# Patient Record
Sex: FEMALE | Race: WHITE | NOT HISPANIC OR LATINO
[De-identification: names, ages, dates, MRNs, and addresses within clinical notes are randomized per-mention and may not be internally consistent; named-entity substitution may affect disease eponyms.]

---

## 2017-10-03 ENCOUNTER — APPOINTMENT (OUTPATIENT)
Dept: ORTHOPEDIC SURGERY | Facility: CLINIC | Age: 77
End: 2017-10-03
Payer: MEDICARE

## 2017-10-03 VITALS
HEART RATE: 69 BPM | BODY MASS INDEX: 24.41 KG/M2 | DIASTOLIC BLOOD PRESSURE: 88 MMHG | WEIGHT: 143 LBS | SYSTOLIC BLOOD PRESSURE: 134 MMHG | HEIGHT: 64 IN

## 2017-10-03 DIAGNOSIS — S83.004A UNSPECIFIED DISLOCATION OF RIGHT PATELLA, INITIAL ENCOUNTER: ICD-10-CM

## 2017-10-03 DIAGNOSIS — S83.005A UNSPECIFIED DISLOCATION OF LEFT PATELLA, INITIAL ENCOUNTER: ICD-10-CM

## 2017-10-03 PROCEDURE — 99204 OFFICE O/P NEW MOD 45 MIN: CPT

## 2017-10-03 PROCEDURE — 73562 X-RAY EXAM OF KNEE 3: CPT | Mod: LT

## 2017-10-06 ENCOUNTER — APPOINTMENT (OUTPATIENT)
Dept: MRI IMAGING | Facility: CLINIC | Age: 77
End: 2017-10-06

## 2019-07-12 DIAGNOSIS — Z84.89 FAMILY HISTORY OF OTHER SPECIFIED CONDITIONS: ICD-10-CM

## 2019-07-12 DIAGNOSIS — Z78.9 OTHER SPECIFIED HEALTH STATUS: ICD-10-CM

## 2019-07-12 DIAGNOSIS — Z83.3 FAMILY HISTORY OF DIABETES MELLITUS: ICD-10-CM

## 2019-07-12 DIAGNOSIS — Z82.49 FAMILY HISTORY OF ISCHEMIC HEART DISEASE AND OTHER DISEASES OF THE CIRCULATORY SYSTEM: ICD-10-CM

## 2019-07-12 DIAGNOSIS — K63.5 POLYP OF COLON: ICD-10-CM

## 2019-07-12 DIAGNOSIS — Z81.8 FAMILY HISTORY OF OTHER MENTAL AND BEHAVIORAL DISORDERS: ICD-10-CM

## 2019-07-12 DIAGNOSIS — Z02.82 ENCOUNTER FOR ADOPTION SERVICES: ICD-10-CM

## 2019-07-12 DIAGNOSIS — E04.1 NONTOXIC SINGLE THYROID NODULE: ICD-10-CM

## 2019-07-16 ENCOUNTER — APPOINTMENT (OUTPATIENT)
Dept: INTERNAL MEDICINE | Facility: CLINIC | Age: 79
End: 2019-07-16
Payer: MEDICARE

## 2019-07-16 ENCOUNTER — NON-APPOINTMENT (OUTPATIENT)
Age: 79
End: 2019-07-16

## 2019-07-16 VITALS
RESPIRATION RATE: 16 BRPM | DIASTOLIC BLOOD PRESSURE: 70 MMHG | HEIGHT: 64 IN | HEART RATE: 72 BPM | BODY MASS INDEX: 25.27 KG/M2 | SYSTOLIC BLOOD PRESSURE: 130 MMHG | WEIGHT: 148 LBS

## 2019-07-16 DIAGNOSIS — C44.92 SQUAMOUS CELL CARCINOMA OF SKIN, UNSPECIFIED: ICD-10-CM

## 2019-07-16 DIAGNOSIS — Z80.7 FAMILY HISTORY OF OTHER MALIGNANT NEOPLASMS OF LYMPHOID, HEMATOPOIETIC AND RELATED TISSUES: ICD-10-CM

## 2019-07-16 DIAGNOSIS — H90.5 UNSPECIFIED SENSORINEURAL HEARING LOSS: ICD-10-CM

## 2019-07-16 PROCEDURE — 99203 OFFICE O/P NEW LOW 30 MIN: CPT | Mod: 25

## 2019-07-16 PROCEDURE — G0439: CPT

## 2019-07-16 PROCEDURE — G0444 DEPRESSION SCREEN ANNUAL: CPT | Mod: 59

## 2019-07-16 PROCEDURE — 93000 ELECTROCARDIOGRAM COMPLETE: CPT | Mod: 59

## 2019-07-16 PROCEDURE — 99497 ADVNCD CARE PLAN 30 MIN: CPT | Mod: 33

## 2019-07-16 RX ORDER — AMLODIPINE BESYLATE 5 MG/1
TABLET ORAL
Refills: 0 | Status: DISCONTINUED | COMMUNITY

## 2019-07-16 RX ORDER — CYANOCOBALAMIN (VITAMIN B-12) 1000MCG/ML
VIAL (ML) INJECTION
Refills: 0 | Status: DISCONTINUED | COMMUNITY

## 2019-07-16 NOTE — HISTORY OF PRESENT ILLNESS
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

## 2019-07-16 NOTE — HEALTH RISK ASSESSMENT
[Good] : ~his/her~  mood as  good [] : No [Yes] : Yes [4 or more  times a week (4 pts)] : 4 or more  times a week (4 points) [1 or 2 (0 pts)] : 1 or 2 (0 points) [Never (0 pts)] : Never (0 points) [No falls in past year] : Patient reported no falls in the past year [de-identified] : Yoga [de-identified] : Low salt and fats [Patient reported mammogram was normal] : Patient reported mammogram was normal [Patient reported PAP Smear was normal] : Patient reported PAP Smear was normal [Patient reported colonoscopy was normal] : Patient reported colonoscopy was normal [None] : None [With Family] : lives with family [Retired] : retired [] :  [Feels Safe at Home] : Feels safe at home [Fully functional (bathing, dressing, toileting, transferring, walking, feeding)] : Fully functional (bathing, dressing, toileting, transferring, walking, feeding) [Fully functional (using the telephone, shopping, preparing meals, housekeeping, doing laundry, using] : Fully functional and needs no help or supervision to perform IADLs (using the telephone, shopping, preparing meals, housekeeping, doing laundry, using transportation, managing medications and managing finances) [Smoke Detector] : smoke detector [Carbon Monoxide Detector] : carbon monoxide detector [Seat Belt] :  uses seat belt [Sunscreen] : uses sunscreen [MammogramDate] : 07/18 [PapSmearDate] : 12/18 [ColonoscopyDate] : 07/17 [de-identified] : Dental office [With Patient/Caregiver] : With Patient/Caregiver [Designated Healthcare Proxy] : Designated healthcare proxy [Name: ___] : Health Care Proxy's Name: [unfilled]  [AdvancecareDate] : 07/19 [FreeTextEntry4] : Had long discussion with the patient.\par Discussed with pt the need for a health care proxy.\par Discussed who can be a proxy, forms given if needed, and the need for the proxy to know their wishes and to make sure they will comply.\par The proxy will need to have a copy in their home and the patient should have a copy.\par \par

## 2019-07-16 NOTE — REVIEW OF SYSTEMS
[Hearing Loss] : hearing loss [Negative] : Heme/Lymph [FreeTextEntry3] : last optho - 12/18 [FreeTextEntry9] : ABBIE [de-identified] : sees derm

## 2019-07-16 NOTE — PHYSICAL EXAM
[No Acute Distress] : no acute distress [Well Nourished] : well nourished [Well Developed] : well developed [Well-Appearing] : well-appearing [Normal Sclera/Conjunctiva] : normal sclera/conjunctiva [PERRL] : pupils equal round and reactive to light [EOMI] : extraocular movements intact [Normal Outer Ear/Nose] : the outer ears and nose were normal in appearance [Normal Oropharynx] : the oropharynx was normal [Normal TMs] : both tympanic membranes were normal [No JVD] : no jugular venous distention [No Lymphadenopathy] : no lymphadenopathy [Supple] : supple [Thyroid Normal, No Nodules] : the thyroid was normal and there were no nodules present [No Respiratory Distress] : no respiratory distress  [No Accessory Muscle Use] : no accessory muscle use [Clear to Auscultation] : lungs were clear to auscultation bilaterally [Normal Rate] : normal rate  [Regular Rhythm] : with a regular rhythm [Normal S1, S2] : normal S1 and S2 [No Murmur] : no murmur heard [No Carotid Bruits] : no carotid bruits [No Abdominal Bruit] : a ~M bruit was not heard ~T in the abdomen [No Varicosities] : no varicosities [Pedal Pulses Present] : the pedal pulses are present [No Edema] : there was no peripheral edema [No Palpable Aorta] : no palpable aorta [No Extremity Clubbing/Cyanosis] : no extremity clubbing/cyanosis [Normal Appearance] : normal in appearance [No Nipple Discharge] : no nipple discharge [No Axillary Lymphadenopathy] : no axillary lymphadenopathy [Soft] : abdomen soft [Non Tender] : non-tender [Non-distended] : non-distended [No Masses] : no abdominal mass palpated [No HSM] : no HSM [Normal Bowel Sounds] : normal bowel sounds [Normal Supraclavicular Nodes] : no supraclavicular lymphadenopathy [Normal Axillary Nodes] : no axillary lymphadenopathy [Normal Posterior Cervical Nodes] : no posterior cervical lymphadenopathy [Normal Anterior Cervical Nodes] : no anterior cervical lymphadenopathy [No CVA Tenderness] : no CVA  tenderness [No Spinal Tenderness] : no spinal tenderness [No Joint Swelling] : no joint swelling [Grossly Normal Strength/Tone] : grossly normal strength/tone [No Rash] : no rash [Coordination Grossly Intact] : coordination grossly intact [No Focal Deficits] : no focal deficits [Normal Gait] : normal gait [Deep Tendon Reflexes (DTR)] : deep tendon reflexes were 2+ and symmetric [Normal Affect] : the affect was normal [Normal Insight/Judgement] : insight and judgment were intact [Normal] : affect was normal and insight and judgment were intact [FreeTextEntry1] : Deferred to GYN [de-identified] : Deferred to GYN [de-identified] : minimal tremor

## 2019-10-15 ENCOUNTER — RX RENEWAL (OUTPATIENT)
Age: 79
End: 2019-10-15

## 2019-10-15 DIAGNOSIS — G47.00 INSOMNIA, UNSPECIFIED: ICD-10-CM

## 2019-10-23 ENCOUNTER — RESULT REVIEW (OUTPATIENT)
Age: 79
End: 2019-10-23

## 2020-02-04 ENCOUNTER — APPOINTMENT (OUTPATIENT)
Dept: INTERNAL MEDICINE | Facility: CLINIC | Age: 80
End: 2020-02-04
Payer: MEDICARE

## 2020-02-04 VITALS
BODY MASS INDEX: 25.95 KG/M2 | HEART RATE: 72 BPM | DIASTOLIC BLOOD PRESSURE: 80 MMHG | SYSTOLIC BLOOD PRESSURE: 130 MMHG | WEIGHT: 152 LBS | HEIGHT: 64 IN | RESPIRATION RATE: 16 BRPM

## 2020-02-04 PROCEDURE — 36415 COLL VENOUS BLD VENIPUNCTURE: CPT

## 2020-02-04 PROCEDURE — 99214 OFFICE O/P EST MOD 30 MIN: CPT | Mod: 25

## 2020-02-04 NOTE — ASSESSMENT
[FreeTextEntry1] : Pt with above medical issues.\par Bloods drawn in office.\par Meds to be adjusted.\par FU 6 months - CC

## 2020-02-04 NOTE — HISTORY OF PRESENT ILLNESS
[FreeTextEntry1] : FU for hypertension, hyperlipidemia Barrets\par Occasional exercise\par Watches diet  -but was away\par

## 2020-02-04 NOTE — PHYSICAL EXAM
[Normal Sclera/Conjunctiva] : normal sclera/conjunctiva [Normal Oropharynx] : the oropharynx was normal [No Lymphadenopathy] : no lymphadenopathy [No JVD] : no jugular venous distention [Supple] : supple [Normal] : normal rate, regular rhythm, normal S1 and S2 and no murmur heard [No Edema] : there was no peripheral edema [Soft] : abdomen soft [Non Tender] : non-tender [No HSM] : no HSM [Non-distended] : non-distended [Normal Supraclavicular Nodes] : no supraclavicular lymphadenopathy [Normal Bowel Sounds] : normal bowel sounds [Normal Posterior Cervical Nodes] : no posterior cervical lymphadenopathy [Normal Anterior Cervical Nodes] : no anterior cervical lymphadenopathy [Alert and Oriented x3] : oriented to person, place, and time [No Focal Deficits] : no focal deficits

## 2020-02-05 LAB
ALBUMIN SERPL ELPH-MCNC: 4.4 G/DL
ALP BLD-CCNC: 110 U/L
ALT SERPL-CCNC: 34 U/L
ANION GAP SERPL CALC-SCNC: 14 MMOL/L
AST SERPL-CCNC: 17 U/L
BILIRUB SERPL-MCNC: 0.8 MG/DL
BUN SERPL-MCNC: 18 MG/DL
CALCIUM SERPL-MCNC: 9 MG/DL
CHLORIDE SERPL-SCNC: 104 MMOL/L
CHOLEST SERPL-MCNC: 194 MG/DL
CHOLEST/HDLC SERPL: 2.6 RATIO
CO2 SERPL-SCNC: 24 MMOL/L
CREAT SERPL-MCNC: 0.76 MG/DL
GLUCOSE SERPL-MCNC: 100 MG/DL
HDLC SERPL-MCNC: 75 MG/DL
LDLC SERPL CALC-MCNC: 102 MG/DL
POTASSIUM SERPL-SCNC: 4.3 MMOL/L
PROT SERPL-MCNC: 7.2 G/DL
SODIUM SERPL-SCNC: 142 MMOL/L
TRIGL SERPL-MCNC: 85 MG/DL

## 2020-07-26 ENCOUNTER — RX RENEWAL (OUTPATIENT)
Age: 80
End: 2020-07-26

## 2020-08-04 ENCOUNTER — APPOINTMENT (OUTPATIENT)
Dept: INTERNAL MEDICINE | Facility: CLINIC | Age: 80
End: 2020-08-04
Payer: MEDICARE

## 2020-08-04 PROCEDURE — 36415 COLL VENOUS BLD VENIPUNCTURE: CPT

## 2020-08-05 LAB
25(OH)D3 SERPL-MCNC: 37 NG/ML
ALBUMIN SERPL ELPH-MCNC: 4.4 G/DL
ALP BLD-CCNC: 89 U/L
ALT SERPL-CCNC: 24 U/L
ANION GAP SERPL CALC-SCNC: 15 MMOL/L
AST SERPL-CCNC: 19 U/L
BASOPHILS # BLD AUTO: 0.04 K/UL
BASOPHILS NFR BLD AUTO: 0.6 %
BILIRUB SERPL-MCNC: 0.6 MG/DL
BUN SERPL-MCNC: 17 MG/DL
CALCIUM SERPL-MCNC: 9.1 MG/DL
CHLORIDE SERPL-SCNC: 106 MMOL/L
CHOLEST SERPL-MCNC: 179 MG/DL
CHOLEST/HDLC SERPL: 2.2 RATIO
CO2 SERPL-SCNC: 24 MMOL/L
CREAT SERPL-MCNC: 0.79 MG/DL
EOSINOPHIL # BLD AUTO: 0.16 K/UL
EOSINOPHIL NFR BLD AUTO: 2.2 %
GLUCOSE SERPL-MCNC: 111 MG/DL
HCT VFR BLD CALC: 48 %
HDLC SERPL-MCNC: 81 MG/DL
HGB BLD-MCNC: 15.1 G/DL
IMM GRANULOCYTES NFR BLD AUTO: 0.1 %
LDLC SERPL CALC-MCNC: 83 MG/DL
LYMPHOCYTES # BLD AUTO: 1.62 K/UL
LYMPHOCYTES NFR BLD AUTO: 22.8 %
MAN DIFF?: NORMAL
MCHC RBC-ENTMCNC: 31.1 PG
MCHC RBC-ENTMCNC: 31.5 GM/DL
MCV RBC AUTO: 99 FL
MONOCYTES # BLD AUTO: 0.7 K/UL
MONOCYTES NFR BLD AUTO: 9.8 %
NEUTROPHILS # BLD AUTO: 4.59 K/UL
NEUTROPHILS NFR BLD AUTO: 64.5 %
PLATELET # BLD AUTO: 200 K/UL
POTASSIUM SERPL-SCNC: 4.3 MMOL/L
PROT SERPL-MCNC: 6.7 G/DL
RBC # BLD: 4.85 M/UL
RBC # FLD: 12.7 %
SODIUM SERPL-SCNC: 144 MMOL/L
T4 SERPL-MCNC: 6 UG/DL
TRIGL SERPL-MCNC: 74 MG/DL
TSH SERPL-ACNC: 0.56 UIU/ML
WBC # FLD AUTO: 7.12 K/UL

## 2020-08-06 LAB
ESTIMATED AVERAGE GLUCOSE: 105 MG/DL
HBA1C MFR BLD HPLC: 5.3 %

## 2020-08-13 ENCOUNTER — LABORATORY RESULT (OUTPATIENT)
Age: 80
End: 2020-08-13

## 2020-08-13 ENCOUNTER — APPOINTMENT (OUTPATIENT)
Dept: INTERNAL MEDICINE | Facility: CLINIC | Age: 80
End: 2020-08-13
Payer: MEDICARE

## 2020-08-13 ENCOUNTER — NON-APPOINTMENT (OUTPATIENT)
Age: 80
End: 2020-08-13

## 2020-08-13 VITALS
HEART RATE: 76 BPM | HEIGHT: 64 IN | SYSTOLIC BLOOD PRESSURE: 130 MMHG | RESPIRATION RATE: 16 BRPM | TEMPERATURE: 97.1 F | DIASTOLIC BLOOD PRESSURE: 80 MMHG | BODY MASS INDEX: 25.78 KG/M2 | WEIGHT: 151 LBS

## 2020-08-13 DIAGNOSIS — R91.8 OTHER NONSPECIFIC ABNORMAL FINDING OF LUNG FIELD: ICD-10-CM

## 2020-08-13 DIAGNOSIS — Z85.3 PERSONAL HISTORY OF MALIGNANT NEOPLASM OF BREAST: ICD-10-CM

## 2020-08-13 PROCEDURE — G0439: CPT

## 2020-08-13 PROCEDURE — G0444 DEPRESSION SCREEN ANNUAL: CPT | Mod: 59

## 2020-08-13 PROCEDURE — 99213 OFFICE O/P EST LOW 20 MIN: CPT | Mod: 25

## 2020-08-13 PROCEDURE — 99497 ADVNCD CARE PLAN 30 MIN: CPT | Mod: 33

## 2020-08-13 PROCEDURE — 93000 ELECTROCARDIOGRAM COMPLETE: CPT | Mod: 59

## 2020-08-13 NOTE — HISTORY OF PRESENT ILLNESS
[FreeTextEntry1] : Well visit and follow up for management of:\par Hypertension - on meds\par Hyperlipidemia - on meds

## 2020-08-13 NOTE — HEALTH RISK ASSESSMENT
[Good] : ~his/her~ current health as good [4 or more  times a week (4 pts)] : 4 or more  times a week (4 points) [Yes] : Yes [Never (0 pts)] : Never (0 points) [1 or 2 (0 pts)] : 1 or 2 (0 points) [Patient reported mammogram was normal] : Patient reported mammogram was normal [Patient reported PAP Smear was normal] : Patient reported PAP Smear was normal [Patient reported colonoscopy was normal] : Patient reported colonoscopy was normal [Retired] : retired [None] : None [With Family] : lives with family [Feels Safe at Home] : Feels safe at home [] :  [Fully functional (bathing, dressing, toileting, transferring, walking, feeding)] : Fully functional (bathing, dressing, toileting, transferring, walking, feeding) [Fully functional (using the telephone, shopping, preparing meals, housekeeping, doing laundry, using] : Fully functional and needs no help or supervision to perform IADLs (using the telephone, shopping, preparing meals, housekeeping, doing laundry, using transportation, managing medications and managing finances) [Smoke Detector] : smoke detector [Carbon Monoxide Detector] : carbon monoxide detector [Seat Belt] :  uses seat belt [Sunscreen] : uses sunscreen [With Patient/Caregiver] : With Patient/Caregiver [Name: ___] : Health Care Proxy's Name: [unfilled]  [Designated Healthcare Proxy] : Designated healthcare proxy [] : No [de-identified] : Swimming [de-identified] : Low salt and fat [Change in mental status noted] : No change in mental status noted [MammogramDate] : 01/20 [PapSmearDate] : 12/19 [ColonoscopyDate] : 01/17 [AdvancecareDate] : 08/20 [FreeTextEntry4] : Had long discussion with the patient.\par Discussed with pt the need for a health care proxy.\par Discussed who can be a proxy, forms given if needed, and the need for the proxy to know their wishes and to make sure they will comply.\par The proxy will need to have a copy in their home and the patient should have a copy.\par \par

## 2020-08-13 NOTE — PHYSICAL EXAM
[Well Nourished] : well nourished [No Acute Distress] : no acute distress [Well Developed] : well developed [Well-Appearing] : well-appearing [Normal Sclera/Conjunctiva] : normal sclera/conjunctiva [PERRL] : pupils equal round and reactive to light [Normal Oropharynx] : the oropharynx was normal [Normal Outer Ear/Nose] : the outer ears and nose were normal in appearance [EOMI] : extraocular movements intact [Normal TMs] : both tympanic membranes were normal [No JVD] : no jugular venous distention [No Lymphadenopathy] : no lymphadenopathy [Supple] : supple [Thyroid Normal, No Nodules] : the thyroid was normal and there were no nodules present [No Respiratory Distress] : no respiratory distress  [Clear to Auscultation] : lungs were clear to auscultation bilaterally [No Accessory Muscle Use] : no accessory muscle use [Regular Rhythm] : with a regular rhythm [Normal Rate] : normal rate  [No Murmur] : no murmur heard [Normal S1, S2] : normal S1 and S2 [No Carotid Bruits] : no carotid bruits [No Abdominal Bruit] : a ~M bruit was not heard ~T in the abdomen [No Varicosities] : no varicosities [No Edema] : there was no peripheral edema [Pedal Pulses Present] : the pedal pulses are present [No Extremity Clubbing/Cyanosis] : no extremity clubbing/cyanosis [No Palpable Aorta] : no palpable aorta [No Nipple Discharge] : no nipple discharge [No Axillary Lymphadenopathy] : no axillary lymphadenopathy [Soft] : abdomen soft [Non Tender] : non-tender [Non-distended] : non-distended [No HSM] : no HSM [No Masses] : no abdominal mass palpated [Normal Bowel Sounds] : normal bowel sounds [Normal Axillary Nodes] : no axillary lymphadenopathy [Normal Supraclavicular Nodes] : no supraclavicular lymphadenopathy [No CVA Tenderness] : no CVA  tenderness [Normal Posterior Cervical Nodes] : no posterior cervical lymphadenopathy [Normal Anterior Cervical Nodes] : no anterior cervical lymphadenopathy [Grossly Normal Strength/Tone] : grossly normal strength/tone [No Spinal Tenderness] : no spinal tenderness [No Joint Swelling] : no joint swelling [No Rash] : no rash [Coordination Grossly Intact] : coordination grossly intact [Normal Gait] : normal gait [No Focal Deficits] : no focal deficits [Normal Affect] : the affect was normal [Normal Insight/Judgement] : insight and judgment were intact [Normal] : affect was normal and insight and judgment were intact [de-identified] : SP left lumpectomy [FreeTextEntry1] : Deferred to GYN [de-identified] : Deferred to GYN [de-identified] : KIMBERLYND - especially hands

## 2020-08-13 NOTE — REVIEW OF SYSTEMS
[Hearing Loss] : hearing loss [Negative] : Psychiatric [FreeTextEntry3] : last optho - 3 weeks  -OK [de-identified] : sees derm

## 2020-08-13 NOTE — ASSESSMENT
[FreeTextEntry1] : Pt with above medical issues which requires extra work in addition to the well visit.\par Labs reviewed.\par Meds adjusted.\par Health counseling given\par FU 6 months

## 2020-08-14 LAB
APPEARANCE: ABNORMAL
BILIRUBIN URINE: NEGATIVE
BLOOD URINE: NEGATIVE
COLOR: ABNORMAL
CREAT SPEC-SCNC: 112 MG/DL
GLUCOSE QUALITATIVE U: NEGATIVE
KETONES URINE: NORMAL
LEUKOCYTE ESTERASE URINE: ABNORMAL
MICROALBUMIN 24H UR DL<=1MG/L-MCNC: 3.6 MG/DL
MICROALBUMIN/CREAT 24H UR-RTO: 32 MG/G
NITRITE URINE: POSITIVE
PH URINE: 5.5
PROTEIN URINE: NORMAL
SPECIFIC GRAVITY URINE: 1.02
UROBILINOGEN URINE: NORMAL

## 2020-11-06 ENCOUNTER — RX RENEWAL (OUTPATIENT)
Age: 80
End: 2020-11-06

## 2020-11-13 NOTE — ASSESSMENT
[FreeTextEntry1] : Pt with above medical issues which requires extra work in addition to the well visit.\par To reestablish care\par Labs reviewed\par To obtain old records\par Meds adjusted\par FU 6 months\par Health counseling given.
No

## 2020-12-02 ENCOUNTER — NON-APPOINTMENT (OUTPATIENT)
Age: 80
End: 2020-12-02

## 2020-12-15 ENCOUNTER — RX RENEWAL (OUTPATIENT)
Age: 80
End: 2020-12-15

## 2021-01-29 ENCOUNTER — RX RENEWAL (OUTPATIENT)
Age: 81
End: 2021-01-29

## 2021-02-06 ENCOUNTER — APPOINTMENT (OUTPATIENT)
Dept: INTERNAL MEDICINE | Facility: CLINIC | Age: 81
End: 2021-02-06
Payer: MEDICARE

## 2021-02-06 VITALS
TEMPERATURE: 97.3 F | WEIGHT: 154 LBS | SYSTOLIC BLOOD PRESSURE: 130 MMHG | BODY MASS INDEX: 26.43 KG/M2 | RESPIRATION RATE: 16 BRPM | HEART RATE: 78 BPM | DIASTOLIC BLOOD PRESSURE: 80 MMHG

## 2021-02-06 DIAGNOSIS — Z23 ENCOUNTER FOR IMMUNIZATION: ICD-10-CM

## 2021-02-06 PROCEDURE — 99214 OFFICE O/P EST MOD 30 MIN: CPT | Mod: 25

## 2021-02-06 PROCEDURE — 36415 COLL VENOUS BLD VENIPUNCTURE: CPT

## 2021-02-06 RX ORDER — MULTIVITAMIN
TABLET ORAL DAILY
Refills: 0 | Status: DISCONTINUED | COMMUNITY
Start: 2019-07-16 | End: 2021-02-06

## 2021-02-06 NOTE — PHYSICAL EXAM
[Normal Sclera/Conjunctiva] : normal sclera/conjunctiva [No JVD] : no jugular venous distention [No Lymphadenopathy] : no lymphadenopathy [Supple] : supple [Normal] : normal rate, regular rhythm, normal S1 and S2 and no murmur heard [No Edema] : there was no peripheral edema [Soft] : abdomen soft [Non Tender] : non-tender [Non-distended] : non-distended [No Masses] : no abdominal mass palpated [No HSM] : no HSM [Normal Supraclavicular Nodes] : no supraclavicular lymphadenopathy [Normal Posterior Cervical Nodes] : no posterior cervical lymphadenopathy [Normal Anterior Cervical Nodes] : no anterior cervical lymphadenopathy [No Focal Deficits] : no focal deficits [Alert and Oriented x3] : oriented to person, place, and time

## 2021-02-06 NOTE — HISTORY OF PRESENT ILLNESS
[FreeTextEntry1] : FU for hypertension, hyperlipidemia, GERD, \par Watching diet\par Exercising\par Weight stable\par

## 2021-02-06 NOTE — ASSESSMENT
[FreeTextEntry1] : Pt with above medical issues.\par Bloods drawn in office.\par Meds to be adjusted\par FU 6 months - CC

## 2021-02-08 ENCOUNTER — NON-APPOINTMENT (OUTPATIENT)
Age: 81
End: 2021-02-08

## 2021-02-08 LAB
ALBUMIN SERPL ELPH-MCNC: 4.3 G/DL
ALP BLD-CCNC: 112 U/L
ALT SERPL-CCNC: 29 U/L
ANION GAP SERPL CALC-SCNC: 13 MMOL/L
AST SERPL-CCNC: 21 U/L
BILIRUB SERPL-MCNC: 0.7 MG/DL
BUN SERPL-MCNC: 20 MG/DL
CALCIUM SERPL-MCNC: 9.3 MG/DL
CHLORIDE SERPL-SCNC: 104 MMOL/L
CHOLEST SERPL-MCNC: 192 MG/DL
CO2 SERPL-SCNC: 23 MMOL/L
CREAT SERPL-MCNC: 0.88 MG/DL
GLUCOSE SERPL-MCNC: 111 MG/DL
HDLC SERPL-MCNC: 86 MG/DL
LDLC SERPL CALC-MCNC: 80 MG/DL
NONHDLC SERPL-MCNC: 106 MG/DL
POTASSIUM SERPL-SCNC: 4.3 MMOL/L
PROT SERPL-MCNC: 7.1 G/DL
SODIUM SERPL-SCNC: 140 MMOL/L
TRIGL SERPL-MCNC: 125 MG/DL

## 2021-04-20 ENCOUNTER — NON-APPOINTMENT (OUTPATIENT)
Age: 81
End: 2021-04-20

## 2021-04-28 ENCOUNTER — RX RENEWAL (OUTPATIENT)
Age: 81
End: 2021-04-28

## 2021-07-22 ENCOUNTER — RX RENEWAL (OUTPATIENT)
Age: 81
End: 2021-07-22

## 2021-08-10 ENCOUNTER — APPOINTMENT (OUTPATIENT)
Dept: INTERNAL MEDICINE | Facility: CLINIC | Age: 81
End: 2021-08-10
Payer: MEDICARE

## 2021-08-10 ENCOUNTER — LABORATORY RESULT (OUTPATIENT)
Age: 81
End: 2021-08-10

## 2021-08-10 PROCEDURE — 36415 COLL VENOUS BLD VENIPUNCTURE: CPT

## 2021-08-11 LAB
25(OH)D3 SERPL-MCNC: 39.9 NG/ML
ALBUMIN SERPL ELPH-MCNC: 4.2 G/DL
ALP BLD-CCNC: 107 U/L
ALT SERPL-CCNC: 30 U/L
ANION GAP SERPL CALC-SCNC: 13 MMOL/L
APPEARANCE: ABNORMAL
AST SERPL-CCNC: 24 U/L
BASOPHILS # BLD AUTO: 0.05 K/UL
BASOPHILS NFR BLD AUTO: 0.6 %
BILIRUB SERPL-MCNC: 0.7 MG/DL
BILIRUBIN URINE: NEGATIVE
BLOOD URINE: NEGATIVE
BUN SERPL-MCNC: 17 MG/DL
CALCIUM SERPL-MCNC: 8.8 MG/DL
CHLORIDE SERPL-SCNC: 107 MMOL/L
CHOLEST SERPL-MCNC: 180 MG/DL
CO2 SERPL-SCNC: 25 MMOL/L
COLOR: YELLOW
CREAT SERPL-MCNC: 0.82 MG/DL
EOSINOPHIL # BLD AUTO: 0.23 K/UL
EOSINOPHIL NFR BLD AUTO: 2.6 %
GLUCOSE QUALITATIVE U: NEGATIVE
GLUCOSE SERPL-MCNC: 117 MG/DL
HCT VFR BLD CALC: 48.2 %
HDLC SERPL-MCNC: 83 MG/DL
HGB BLD-MCNC: 15.3 G/DL
IMM GRANULOCYTES NFR BLD AUTO: 0.3 %
KETONES URINE: NEGATIVE
LDLC SERPL CALC-MCNC: 88 MG/DL
LEUKOCYTE ESTERASE URINE: NEGATIVE
LYMPHOCYTES # BLD AUTO: 1.38 K/UL
LYMPHOCYTES NFR BLD AUTO: 15.9 %
MAN DIFF?: NORMAL
MCHC RBC-ENTMCNC: 31.2 PG
MCHC RBC-ENTMCNC: 31.7 GM/DL
MCV RBC AUTO: 98.2 FL
MONOCYTES # BLD AUTO: 0.78 K/UL
MONOCYTES NFR BLD AUTO: 9 %
NEUTROPHILS # BLD AUTO: 6.22 K/UL
NEUTROPHILS NFR BLD AUTO: 71.6 %
NITRITE URINE: POSITIVE
NONHDLC SERPL-MCNC: 97 MG/DL
PH URINE: 6.5
PLATELET # BLD AUTO: 230 K/UL
POTASSIUM SERPL-SCNC: 4.4 MMOL/L
PROT SERPL-MCNC: 6.9 G/DL
PROTEIN URINE: NORMAL
RBC # BLD: 4.91 M/UL
RBC # FLD: 13 %
SODIUM SERPL-SCNC: 145 MMOL/L
SPECIFIC GRAVITY URINE: 1.02
T4 SERPL-MCNC: 6.5 UG/DL
TRIGL SERPL-MCNC: 47 MG/DL
TSH SERPL-ACNC: 0.53 UIU/ML
UROBILINOGEN URINE: ABNORMAL
WBC # FLD AUTO: 8.69 K/UL

## 2021-08-12 LAB
ESTIMATED AVERAGE GLUCOSE: 105 MG/DL
HBA1C MFR BLD HPLC: 5.3 %

## 2021-08-21 ENCOUNTER — NON-APPOINTMENT (OUTPATIENT)
Age: 81
End: 2021-08-21

## 2021-08-21 ENCOUNTER — APPOINTMENT (OUTPATIENT)
Dept: INTERNAL MEDICINE | Facility: CLINIC | Age: 81
End: 2021-08-21
Payer: MEDICARE

## 2021-08-21 VITALS
HEART RATE: 78 BPM | BODY MASS INDEX: 25.78 KG/M2 | DIASTOLIC BLOOD PRESSURE: 75 MMHG | WEIGHT: 151 LBS | HEIGHT: 64 IN | SYSTOLIC BLOOD PRESSURE: 130 MMHG | RESPIRATION RATE: 16 BRPM

## 2021-08-21 PROCEDURE — G0444 DEPRESSION SCREEN ANNUAL: CPT | Mod: 59

## 2021-08-21 PROCEDURE — G0439: CPT

## 2021-08-21 PROCEDURE — 93000 ELECTROCARDIOGRAM COMPLETE: CPT | Mod: 59

## 2021-08-21 PROCEDURE — 99214 OFFICE O/P EST MOD 30 MIN: CPT | Mod: 25

## 2021-08-21 PROCEDURE — 99497 ADVNCD CARE PLAN 30 MIN: CPT

## 2021-08-21 NOTE — HISTORY OF PRESENT ILLNESS
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

## 2021-08-21 NOTE — PHYSICAL EXAM
[No Acute Distress] : no acute distress [Well Nourished] : well nourished [Well Developed] : well developed [Well-Appearing] : well-appearing [Normal Sclera/Conjunctiva] : normal sclera/conjunctiva [PERRL] : pupils equal round and reactive to light [EOMI] : extraocular movements intact [Normal Outer Ear/Nose] : the outer ears and nose were normal in appearance [Normal Oropharynx] : the oropharynx was normal [Normal TMs] : both tympanic membranes were normal [No JVD] : no jugular venous distention [No Lymphadenopathy] : no lymphadenopathy [Supple] : supple [Thyroid Normal, No Nodules] : the thyroid was normal and there were no nodules present [No Respiratory Distress] : no respiratory distress  [No Accessory Muscle Use] : no accessory muscle use [Clear to Auscultation] : lungs were clear to auscultation bilaterally [Normal Rate] : normal rate  [Regular Rhythm] : with a regular rhythm [Normal S1, S2] : normal S1 and S2 [No Murmur] : no murmur heard [No Carotid Bruits] : no carotid bruits [No Abdominal Bruit] : a ~M bruit was not heard ~T in the abdomen [No Varicosities] : no varicosities [Pedal Pulses Present] : the pedal pulses are present [No Edema] : there was no peripheral edema [No Palpable Aorta] : no palpable aorta [No Extremity Clubbing/Cyanosis] : no extremity clubbing/cyanosis [No Nipple Discharge] : no nipple discharge [No Axillary Lymphadenopathy] : no axillary lymphadenopathy [Soft] : abdomen soft [Non Tender] : non-tender [Non-distended] : non-distended [No Masses] : no abdominal mass palpated [No HSM] : no HSM [Normal Bowel Sounds] : normal bowel sounds [Normal Supraclavicular Nodes] : no supraclavicular lymphadenopathy [Normal Posterior Cervical Nodes] : no posterior cervical lymphadenopathy [Normal Axillary Nodes] : no axillary lymphadenopathy [Normal Anterior Cervical Nodes] : no anterior cervical lymphadenopathy [No CVA Tenderness] : no CVA  tenderness [No Spinal Tenderness] : no spinal tenderness [No Joint Swelling] : no joint swelling [Grossly Normal Strength/Tone] : grossly normal strength/tone [No Rash] : no rash [Coordination Grossly Intact] : coordination grossly intact [No Focal Deficits] : no focal deficits [Normal Gait] : normal gait [Deep Tendon Reflexes (DTR)] : deep tendon reflexes were 2+ and symmetric [Normal Affect] : the affect was normal [Normal Insight/Judgement] : insight and judgment were intact [Normal] : affect was normal and insight and judgment were intact [de-identified] : SP left lumpectomy - [FreeTextEntry1] : Deferred to GYN [de-identified] : Deferred to GYN [de-identified] : ABBIE

## 2021-08-21 NOTE — HEALTH RISK ASSESSMENT
[Good] : ~his/her~  mood as  good [] : No [Yes] : Yes [4 or more  times a week (4 pts)] : 4 or more  times a week (4 points) [1 or 2 (0 pts)] : 1 or 2 (0 points) [Never (0 pts)] : Never (0 points) [No falls in past year] : Patient reported no falls in the past year [PHQ-2 Negative - No further assessment needed] : PHQ-2 Negative - No further assessment needed [de-identified] : Swimming, walking [de-identified] : Low salt and fat [Patient reported mammogram was normal] : Patient reported mammogram was normal [Change in mental status noted] : No change in mental status noted [None] : None [With Family] : lives with family [Retired] : retired [] :  [Feels Safe at Home] : Feels safe at home [Fully functional (bathing, dressing, toileting, transferring, walking, feeding)] : Fully functional (bathing, dressing, toileting, transferring, walking, feeding) [Fully functional (using the telephone, shopping, preparing meals, housekeeping, doing laundry, using] : Fully functional and needs no help or supervision to perform IADLs (using the telephone, shopping, preparing meals, housekeeping, doing laundry, using transportation, managing medications and managing finances) [Smoke Detector] : smoke detector [Carbon Monoxide Detector] : carbon monoxide detector [Seat Belt] :  uses seat belt [Sunscreen] : uses sunscreen [MammogramDate] : 12/20 [With Patient/Caregiver] : , with patient/caregiver [Designated Healthcare Proxy] : Designated healthcare proxy [Name: ___] : Health Care Proxy's Name: [unfilled]  [Time Spent: ___ minutes] : Time Spent: [unfilled] minutes [AdvancecareDate] : 08/21 [FreeTextEntry4] : Had long discussion with the patient.\par Discussed with pt the need for a health care proxy.\par Discussed who can be a proxy, forms given if needed, and the need for the proxy to know their wishes and to make sure they will comply.\par The proxy will need to have a copy in their home and the patient should have a copy.\par \par Pt to make sure copy of proxy is in the house - to discuss wishes.

## 2021-08-21 NOTE — ASSESSMENT
[FreeTextEntry1] : Pt with above medical issues which requires extra work in addition to the well visit.\par Labs reviewed.\par Meds adjusted.\par Health counseling given\par FU 6 months.

## 2021-08-21 NOTE — REVIEW OF SYSTEMS
[Hearing Loss] : hearing loss [Negative] : Heme/Lymph [FreeTextEntry3] : last optho - 1 month [de-identified] : sees derm

## 2021-09-10 ENCOUNTER — RX RENEWAL (OUTPATIENT)
Age: 81
End: 2021-09-10

## 2022-02-08 DIAGNOSIS — R92.2 INCONCLUSIVE MAMMOGRAM: ICD-10-CM

## 2022-02-19 ENCOUNTER — APPOINTMENT (OUTPATIENT)
Dept: INTERNAL MEDICINE | Facility: CLINIC | Age: 82
End: 2022-02-19
Payer: MEDICARE

## 2022-02-19 VITALS
BODY MASS INDEX: 25.4 KG/M2 | HEART RATE: 72 BPM | DIASTOLIC BLOOD PRESSURE: 80 MMHG | SYSTOLIC BLOOD PRESSURE: 130 MMHG | WEIGHT: 148 LBS | RESPIRATION RATE: 16 BRPM

## 2022-02-19 DIAGNOSIS — E55.9 VITAMIN D DEFICIENCY, UNSPECIFIED: ICD-10-CM

## 2022-02-19 DIAGNOSIS — R73.09 OTHER ABNORMAL GLUCOSE: ICD-10-CM

## 2022-02-19 PROCEDURE — 36415 COLL VENOUS BLD VENIPUNCTURE: CPT

## 2022-02-19 PROCEDURE — 99214 OFFICE O/P EST MOD 30 MIN: CPT | Mod: 25

## 2022-02-19 NOTE — HISTORY OF PRESENT ILLNESS
[FreeTextEntry1] : FU for hypertension, hyperlipidemia, Barrets, Low vit D\par Watching diet\par Weight stable.

## 2022-02-21 ENCOUNTER — NON-APPOINTMENT (OUTPATIENT)
Age: 82
End: 2022-02-21

## 2022-02-21 LAB
ALBUMIN SERPL ELPH-MCNC: 4.5 G/DL
ALP BLD-CCNC: 99 U/L
ALT SERPL-CCNC: 23 U/L
ANION GAP SERPL CALC-SCNC: 14 MMOL/L
AST SERPL-CCNC: 18 U/L
BILIRUB SERPL-MCNC: 0.4 MG/DL
BUN SERPL-MCNC: 15 MG/DL
CALCIUM SERPL-MCNC: 9.4 MG/DL
CHLORIDE SERPL-SCNC: 104 MMOL/L
CHOLEST SERPL-MCNC: 182 MG/DL
CO2 SERPL-SCNC: 24 MMOL/L
CREAT SERPL-MCNC: 0.84 MG/DL
ESTIMATED AVERAGE GLUCOSE: 108 MG/DL
GLUCOSE SERPL-MCNC: 117 MG/DL
HBA1C MFR BLD HPLC: 5.4 %
HDLC SERPL-MCNC: 86 MG/DL
LDLC SERPL CALC-MCNC: 84 MG/DL
NONHDLC SERPL-MCNC: 96 MG/DL
POTASSIUM SERPL-SCNC: 3.9 MMOL/L
PROT SERPL-MCNC: 7.2 G/DL
SODIUM SERPL-SCNC: 142 MMOL/L
TRIGL SERPL-MCNC: 58 MG/DL

## 2022-04-01 ENCOUNTER — APPOINTMENT (OUTPATIENT)
Dept: OBGYN | Facility: CLINIC | Age: 82
End: 2022-04-01
Payer: MEDICARE

## 2022-04-01 VITALS
BODY MASS INDEX: 25.27 KG/M2 | DIASTOLIC BLOOD PRESSURE: 84 MMHG | WEIGHT: 148 LBS | SYSTOLIC BLOOD PRESSURE: 132 MMHG | HEIGHT: 64 IN

## 2022-04-01 DIAGNOSIS — Z13.71 ENCOUNTER FOR NONPROCREATIVE SCREENING FOR GENETIC DISEASE CARRIER STATUS: ICD-10-CM

## 2022-04-01 DIAGNOSIS — N81.11 CYSTOCELE, MIDLINE: ICD-10-CM

## 2022-04-01 DIAGNOSIS — N81.4 UTEROVAGINAL PROLAPSE, UNSPECIFIED: ICD-10-CM

## 2022-04-01 DIAGNOSIS — N90.5 ATROPHY OF VULVA: ICD-10-CM

## 2022-04-01 PROCEDURE — G0328 FECAL BLOOD SCRN IMMUNOASSAY: CPT | Mod: GA,QW

## 2022-04-01 PROCEDURE — 77080 DXA BONE DENSITY AXIAL: CPT

## 2022-04-01 PROCEDURE — G0101: CPT | Mod: GA

## 2022-04-15 ENCOUNTER — RX RENEWAL (OUTPATIENT)
Age: 82
End: 2022-04-15

## 2022-07-27 ENCOUNTER — NON-APPOINTMENT (OUTPATIENT)
Age: 82
End: 2022-07-27

## 2022-07-27 ENCOUNTER — APPOINTMENT (OUTPATIENT)
Dept: OBGYN | Facility: CLINIC | Age: 82
End: 2022-07-27

## 2022-07-27 VITALS — DIASTOLIC BLOOD PRESSURE: 87 MMHG | SYSTOLIC BLOOD PRESSURE: 133 MMHG

## 2022-07-27 DIAGNOSIS — N90.89 OTHER SPECIFIED NONINFLAMMATORY DISORDERS OF VULVA AND PERINEUM: ICD-10-CM

## 2022-07-27 PROCEDURE — 99212 OFFICE O/P EST SF 10 MIN: CPT | Mod: 25

## 2022-07-27 PROCEDURE — 56405 I&D VULVA/PERINEAL ABSCESS: CPT

## 2022-07-29 LAB
HSV+VZV DNA SPEC QL NAA+PROBE: NOT DETECTED
SPECIMEN SOURCE: NORMAL

## 2022-08-04 ENCOUNTER — NON-APPOINTMENT (OUTPATIENT)
Age: 82
End: 2022-08-04

## 2022-09-01 ENCOUNTER — NON-APPOINTMENT (OUTPATIENT)
Age: 82
End: 2022-09-01

## 2022-09-08 ENCOUNTER — NON-APPOINTMENT (OUTPATIENT)
Age: 82
End: 2022-09-08

## 2022-09-13 ENCOUNTER — APPOINTMENT (OUTPATIENT)
Dept: INTERNAL MEDICINE | Facility: CLINIC | Age: 82
End: 2022-09-13

## 2022-09-13 ENCOUNTER — NON-APPOINTMENT (OUTPATIENT)
Age: 82
End: 2022-09-13

## 2022-09-13 VITALS
BODY MASS INDEX: 24.07 KG/M2 | SYSTOLIC BLOOD PRESSURE: 120 MMHG | WEIGHT: 141 LBS | HEIGHT: 64 IN | DIASTOLIC BLOOD PRESSURE: 82 MMHG

## 2022-09-13 DIAGNOSIS — Z01.818 ENCOUNTER FOR OTHER PREPROCEDURAL EXAMINATION: ICD-10-CM

## 2022-09-13 DIAGNOSIS — Z12.39 ENCOUNTER FOR OTHER SCREENING FOR MALIGNANT NEOPLASM OF BREAST: ICD-10-CM

## 2022-09-13 PROCEDURE — 99213 OFFICE O/P EST LOW 20 MIN: CPT | Mod: 25

## 2022-09-13 PROCEDURE — 36415 COLL VENOUS BLD VENIPUNCTURE: CPT | Mod: PD

## 2022-09-13 PROCEDURE — 93000 ELECTROCARDIOGRAM COMPLETE: CPT | Mod: 59,PD

## 2022-09-14 ENCOUNTER — TRANSCRIPTION ENCOUNTER (OUTPATIENT)
Age: 82
End: 2022-09-14

## 2022-09-14 ENCOUNTER — OUTPATIENT (OUTPATIENT)
Dept: OUTPATIENT SERVICES | Facility: HOSPITAL | Age: 82
LOS: 1 days | End: 2022-09-14

## 2022-09-14 ENCOUNTER — NON-APPOINTMENT (OUTPATIENT)
Age: 82
End: 2022-09-14

## 2022-09-14 DIAGNOSIS — Z11.52 ENCOUNTER FOR SCREENING FOR COVID-19: ICD-10-CM

## 2022-09-14 LAB
ALBUMIN SERPL ELPH-MCNC: 4.6 G/DL
ALP BLD-CCNC: 119 U/L
ALT SERPL-CCNC: 19 U/L
ANION GAP SERPL CALC-SCNC: 14 MMOL/L
APPEARANCE: CLEAR
APTT BLD: 32.3 SEC
AST SERPL-CCNC: 17 U/L
BACTERIA: ABNORMAL
BASOPHILS # BLD AUTO: 0.04 K/UL
BASOPHILS NFR BLD AUTO: 0.4 %
BILIRUB SERPL-MCNC: 0.6 MG/DL
BILIRUBIN URINE: NEGATIVE
BLOOD URINE: NEGATIVE
BUN SERPL-MCNC: 15 MG/DL
CALCIUM SERPL-MCNC: 9.4 MG/DL
CHLORIDE SERPL-SCNC: 105 MMOL/L
CO2 SERPL-SCNC: 26 MMOL/L
COLOR: YELLOW
CREAT SERPL-MCNC: 0.87 MG/DL
EGFR: 67 ML/MIN/1.73M2
EOSINOPHIL # BLD AUTO: 0.05 K/UL
EOSINOPHIL NFR BLD AUTO: 0.5 %
GLUCOSE QUALITATIVE U: NEGATIVE
GLUCOSE SERPL-MCNC: 105 MG/DL
HCT VFR BLD CALC: 48.2 %
HGB BLD-MCNC: 15.3 G/DL
HYALINE CASTS: 0 /LPF
IMM GRANULOCYTES NFR BLD AUTO: 0.2 %
INR PPP: 0.97 RATIO
KETONES URINE: NORMAL
LEUKOCYTE ESTERASE URINE: ABNORMAL
LYMPHOCYTES # BLD AUTO: 1.23 K/UL
LYMPHOCYTES NFR BLD AUTO: 13.4 %
MAN DIFF?: NORMAL
MCHC RBC-ENTMCNC: 30.5 PG
MCHC RBC-ENTMCNC: 31.7 GM/DL
MCV RBC AUTO: 96 FL
MICROSCOPIC-UA: NORMAL
MONOCYTES # BLD AUTO: 0.74 K/UL
MONOCYTES NFR BLD AUTO: 8.1 %
NEUTROPHILS # BLD AUTO: 7.09 K/UL
NEUTROPHILS NFR BLD AUTO: 77.4 %
NITRITE URINE: POSITIVE
PH URINE: 6.5
PLATELET # BLD AUTO: 241 K/UL
POTASSIUM SERPL-SCNC: 4.6 MMOL/L
PROT SERPL-MCNC: 7.3 G/DL
PROTEIN URINE: NORMAL
PT BLD: 11.3 SEC
RBC # BLD: 5.02 M/UL
RBC # FLD: 12.6 %
RED BLOOD CELLS URINE: 1 /HPF
SARS-COV-2 RNA SPEC QL NAA+PROBE: SIGNIFICANT CHANGE UP
SODIUM SERPL-SCNC: 144 MMOL/L
SPECIFIC GRAVITY URINE: 1.02
SQUAMOUS EPITHELIAL CELLS: 2 /HPF
UROBILINOGEN URINE: ABNORMAL
WBC # FLD AUTO: 9.17 K/UL
WHITE BLOOD CELLS URINE: 10 /HPF

## 2022-09-14 NOTE — HISTORY OF PRESENT ILLNESS
[(Patient denies any chest pain, claudication, dyspnea on exertion, orthopnea, palpitations or syncope)] : Patient denies any chest pain, claudication, dyspnea on exertion, orthopnea, palpitations or syncope [Good (7-10 METs)] : Good (7-10 METs) [Self] : no previous adverse anesthesia reaction [FreeTextEntry1] : kidney surgery - tumor removal  [FreeTextEntry2] : 9/15/22 [FreeTextEntry3] : Dr. Myron Farley  [FreeTextEntry4] : \par here for preop - going for nephrectomy\par \par P: [phone]\par \par Fax: [phone]\par \par fax: [phone]

## 2022-09-14 NOTE — ASSESSMENT
[Patient Optimized for Surgery] : Patient optimized for surgery [FreeTextEntry7] : avoid nsaids 1 week prior to surgery. avoid MVI prior to surery. Last MVI was 9/12/22 - per Radha & Jeaneth it is okay to have the procedure w/last dose of MVI being on 9/12/22; PT will need to be on abx prior to surgery - to be determined given +UA.

## 2022-09-14 NOTE — RESULTS/DATA
[] : results reviewed [de-identified] : wnl  [de-identified] : wnl  [de-identified] : 9/13/22 EGG NSR \par  [de-identified] : +UA \par UCX pending

## 2022-09-15 ENCOUNTER — INPATIENT (INPATIENT)
Facility: HOSPITAL | Age: 82
LOS: 0 days | Discharge: HOME | DRG: 658 | End: 2022-09-16
Attending: SPECIALIST | Admitting: SPECIALIST
Payer: MEDICARE

## 2022-09-15 ENCOUNTER — RESULT REVIEW (OUTPATIENT)
Age: 82
End: 2022-09-15

## 2022-09-15 VITALS
OXYGEN SATURATION: 95 % | HEART RATE: 79 BPM | TEMPERATURE: 98 F | RESPIRATION RATE: 16 BRPM | HEIGHT: 64 IN | WEIGHT: 139.99 LBS | DIASTOLIC BLOOD PRESSURE: 87 MMHG | SYSTOLIC BLOOD PRESSURE: 142 MMHG

## 2022-09-15 DIAGNOSIS — N28.89 OTHER SPECIFIED DISORDERS OF KIDNEY AND URETER: ICD-10-CM

## 2022-09-15 LAB
BLD GP AB SCN SERPL QL: NEGATIVE — SIGNIFICANT CHANGE UP
ESTIMATED AVERAGE GLUCOSE: 111 MG/DL
HBA1C MFR BLD HPLC: 5.5 %
RH IG SCN BLD-IMP: NEGATIVE — SIGNIFICANT CHANGE UP
RH IG SCN BLD-IMP: NEGATIVE — SIGNIFICANT CHANGE UP

## 2022-09-15 PROCEDURE — 88307 TISSUE EXAM BY PATHOLOGIST: CPT | Mod: 26

## 2022-09-15 DEVICE — STAPLER COVIDIEN TRI-STAPLE 60MM TAN RELOAD: Type: IMPLANTABLE DEVICE | Site: LEFT | Status: FUNCTIONAL

## 2022-09-15 DEVICE — VISTASEAL FIBRIN HUMAN 4ML: Type: IMPLANTABLE DEVICE | Site: LEFT | Status: FUNCTIONAL

## 2022-09-15 DEVICE — CLIP APPLIER COVIDIEN ENDOCLIP II 10MM MED/LG: Type: IMPLANTABLE DEVICE | Site: LEFT | Status: FUNCTIONAL

## 2022-09-15 RX ORDER — INFLUENZA VIRUS VACCINE 15; 15; 15; 15 UG/.5ML; UG/.5ML; UG/.5ML; UG/.5ML
0.7 SUSPENSION INTRAMUSCULAR ONCE
Refills: 0 | Status: DISCONTINUED | OUTPATIENT
Start: 2022-09-15 | End: 2022-09-16

## 2022-09-15 RX ORDER — PANTOPRAZOLE SODIUM 20 MG/1
40 TABLET, DELAYED RELEASE ORAL
Refills: 0 | Status: DISCONTINUED | OUTPATIENT
Start: 2022-09-15 | End: 2022-09-16

## 2022-09-15 RX ORDER — ACETAMINOPHEN 500 MG
750 TABLET ORAL EVERY 6 HOURS
Refills: 0 | Status: DISCONTINUED | OUTPATIENT
Start: 2022-09-15 | End: 2022-09-16

## 2022-09-15 RX ORDER — OXYCODONE HYDROCHLORIDE 5 MG/1
5 TABLET ORAL EVERY 4 HOURS
Refills: 0 | Status: DISCONTINUED | OUTPATIENT
Start: 2022-09-15 | End: 2022-09-16

## 2022-09-15 RX ORDER — OMEPRAZOLE 10 MG/1
0 CAPSULE, DELAYED RELEASE ORAL
Qty: 0 | Refills: 0 | DISCHARGE
Start: 2022-09-15

## 2022-09-15 RX ORDER — ATORVASTATIN CALCIUM 80 MG/1
10 TABLET, FILM COATED ORAL AT BEDTIME
Refills: 0 | Status: DISCONTINUED | OUTPATIENT
Start: 2022-09-15 | End: 2022-09-16

## 2022-09-15 RX ORDER — OXYCODONE HYDROCHLORIDE 5 MG/1
10 TABLET ORAL EVERY 4 HOURS
Refills: 0 | Status: DISCONTINUED | OUTPATIENT
Start: 2022-09-15 | End: 2022-09-16

## 2022-09-15 RX ORDER — ONDANSETRON 8 MG/1
4 TABLET, FILM COATED ORAL EVERY 4 HOURS
Refills: 0 | Status: DISCONTINUED | OUTPATIENT
Start: 2022-09-15 | End: 2022-09-16

## 2022-09-15 RX ORDER — HEPARIN SODIUM 5000 [USP'U]/ML
5000 INJECTION INTRAVENOUS; SUBCUTANEOUS EVERY 8 HOURS
Refills: 0 | Status: DISCONTINUED | OUTPATIENT
Start: 2022-09-15 | End: 2022-09-16

## 2022-09-15 RX ORDER — AMLODIPINE BESYLATE 2.5 MG/1
1 TABLET ORAL
Qty: 0 | Refills: 0 | DISCHARGE
Start: 2022-09-15

## 2022-09-15 RX ORDER — SODIUM CHLORIDE 9 MG/ML
1000 INJECTION, SOLUTION INTRAVENOUS
Refills: 0 | Status: DISCONTINUED | OUTPATIENT
Start: 2022-09-15 | End: 2022-09-16

## 2022-09-15 RX ORDER — FENTANYL CITRATE 50 UG/ML
50 INJECTION INTRAVENOUS
Refills: 0 | Status: DISCONTINUED | OUTPATIENT
Start: 2022-09-15 | End: 2022-09-16

## 2022-09-15 RX ORDER — FENTANYL CITRATE 50 UG/ML
25 INJECTION INTRAVENOUS
Refills: 0 | Status: DISCONTINUED | OUTPATIENT
Start: 2022-09-15 | End: 2022-09-16

## 2022-09-15 RX ORDER — AMLODIPINE BESYLATE 2.5 MG/1
5 TABLET ORAL DAILY
Refills: 0 | Status: DISCONTINUED | OUTPATIENT
Start: 2022-09-15 | End: 2022-09-16

## 2022-09-15 RX ORDER — ATORVASTATIN CALCIUM 80 MG/1
1 TABLET, FILM COATED ORAL
Qty: 0 | Refills: 0 | DISCHARGE
Start: 2022-09-15

## 2022-09-15 RX ORDER — SODIUM CHLORIDE 9 MG/ML
3 INJECTION INTRAMUSCULAR; INTRAVENOUS; SUBCUTANEOUS EVERY 8 HOURS
Refills: 0 | Status: DISCONTINUED | OUTPATIENT
Start: 2022-09-15 | End: 2022-09-15

## 2022-09-15 RX ADMIN — SODIUM CHLORIDE 125 MILLILITER(S): 9 INJECTION, SOLUTION INTRAVENOUS at 23:00

## 2022-09-15 RX ADMIN — ONDANSETRON 4 MILLIGRAM(S): 8 TABLET, FILM COATED ORAL at 23:11

## 2022-09-15 RX ADMIN — FENTANYL CITRATE 25 MICROGRAM(S): 50 INJECTION INTRAVENOUS at 23:30

## 2022-09-15 RX ADMIN — FENTANYL CITRATE 25 MICROGRAM(S): 50 INJECTION INTRAVENOUS at 23:15

## 2022-09-15 NOTE — PATIENT PROFILE ADULT - FALL HARM RISK - UNIVERSAL INTERVENTIONS
Bed in lowest position, wheels locked, appropriate side rails in place/Call bell, personal items and telephone in reach/Instruct patient to call for assistance before getting out of bed or chair/Non-slip footwear when patient is out of bed/[place] to call system/Physically safe environment - no spills, clutter or unnecessary equipment/Purposeful Proactive Rounding/Room/bathroom lighting operational, light cord in reach

## 2022-09-15 NOTE — PRE-ANESTHESIA EVALUATION ADULT - NSANTHADDINFOFT_GEN_ALL_CORE
Chart reviewed, including medical and cardiac workup. No evidence of ACS, ADHF, new or dangerous arrhyhtmia, new or critical valvular stenosis. Informed consent obtained, including all R/B/A.

## 2022-09-15 NOTE — PATIENT PROFILE ADULT - FALL HARM RISK - TYPE OF ASSESSMENT
----- Message from Renetta Al sent at 8/20/2019  3:20 PM CDT -----  Contact: self  Patient requesting to speak to nurse regarding she had script for scooter       Patient states the place where the scooter order was sent will like more information regarding order for scooter       Patient did not recall name of place that requesting more information for scooter       Please call to advice [phone] (home)     
Contacted patient and informed forms were needed to be completed and still working on completing them to fax back. Patient verbalized understanding.  
Admission

## 2022-09-15 NOTE — PRE-ANESTHESIA EVALUATION ADULT - NSANTHOSAYNRD_GEN_A_CORE
No. ELTON screening performed.  STOP BANG Legend: 0-2 = LOW Risk; 3-4 = INTERMEDIATE Risk; 5-8 = HIGH Risk

## 2022-09-15 NOTE — H&P PST ADULT - HISTORY OF PRESENT ILLNESS
Arrives for Left nephrectomy.  Saw PMD yesterday Arrives for Left nephrectomy.  Saw PMD yesterday. Note in [place]

## 2022-09-16 ENCOUNTER — TRANSCRIPTION ENCOUNTER (OUTPATIENT)
Age: 82
End: 2022-09-16

## 2022-09-16 VITALS
DIASTOLIC BLOOD PRESSURE: 83 MMHG | HEART RATE: 76 BPM | TEMPERATURE: 99 F | SYSTOLIC BLOOD PRESSURE: 144 MMHG | OXYGEN SATURATION: 94 % | RESPIRATION RATE: 16 BRPM

## 2022-09-16 LAB
ANION GAP SERPL CALC-SCNC: 18 MMOL/L — HIGH (ref 5–17)
BACTERIA UR CULT: ABNORMAL
BUN SERPL-MCNC: 13 MG/DL — SIGNIFICANT CHANGE UP (ref 7–23)
CALCIUM SERPL-MCNC: 7.6 MG/DL — LOW (ref 8.4–10.5)
CHLORIDE SERPL-SCNC: 104 MMOL/L — SIGNIFICANT CHANGE UP (ref 96–108)
CO2 SERPL-SCNC: 16 MMOL/L — LOW (ref 22–31)
CREAT SERPL-MCNC: 0.79 MG/DL — SIGNIFICANT CHANGE UP (ref 0.5–1.3)
EGFR: 75 ML/MIN/1.73M2 — SIGNIFICANT CHANGE UP
GLUCOSE SERPL-MCNC: 106 MG/DL — HIGH (ref 70–99)
HCT VFR BLD CALC: 35.6 % — SIGNIFICANT CHANGE UP (ref 34.5–45)
HGB BLD-MCNC: 11.4 G/DL — LOW (ref 11.5–15.5)
MCHC RBC-ENTMCNC: 30.4 PG — SIGNIFICANT CHANGE UP (ref 27–34)
MCHC RBC-ENTMCNC: 32 GM/DL — SIGNIFICANT CHANGE UP (ref 32–36)
MCV RBC AUTO: 94.9 FL — SIGNIFICANT CHANGE UP (ref 80–100)
NRBC # BLD: 0 /100 WBCS — SIGNIFICANT CHANGE UP (ref 0–0)
PLATELET # BLD AUTO: 160 K/UL — SIGNIFICANT CHANGE UP (ref 150–400)
POTASSIUM SERPL-MCNC: 3.9 MMOL/L — SIGNIFICANT CHANGE UP (ref 3.5–5.3)
POTASSIUM SERPL-SCNC: 3.9 MMOL/L — SIGNIFICANT CHANGE UP (ref 3.5–5.3)
RBC # BLD: 3.75 M/UL — LOW (ref 3.8–5.2)
RBC # FLD: 12.5 % — SIGNIFICANT CHANGE UP (ref 10.3–14.5)
SODIUM SERPL-SCNC: 138 MMOL/L — SIGNIFICANT CHANGE UP (ref 135–145)
WBC # BLD: 11.05 K/UL — HIGH (ref 3.8–10.5)
WBC # FLD AUTO: 11.05 K/UL — HIGH (ref 3.8–10.5)

## 2022-09-16 PROCEDURE — 86900 BLOOD TYPING SEROLOGIC ABO: CPT

## 2022-09-16 PROCEDURE — U0005: CPT

## 2022-09-16 PROCEDURE — U0003: CPT

## 2022-09-16 PROCEDURE — 80048 BASIC METABOLIC PNL TOTAL CA: CPT

## 2022-09-16 PROCEDURE — 86901 BLOOD TYPING SEROLOGIC RH(D): CPT

## 2022-09-16 PROCEDURE — 88307 TISSUE EXAM BY PATHOLOGIST: CPT

## 2022-09-16 PROCEDURE — C1889: CPT

## 2022-09-16 PROCEDURE — C9803: CPT

## 2022-09-16 PROCEDURE — 86850 RBC ANTIBODY SCREEN: CPT

## 2022-09-16 PROCEDURE — 85027 COMPLETE CBC AUTOMATED: CPT

## 2022-09-16 RX ORDER — ONDANSETRON 8 MG/1
4 TABLET, FILM COATED ORAL EVERY 4 HOURS
Refills: 0 | Status: DISCONTINUED | OUTPATIENT
Start: 2022-09-16 | End: 2022-09-16

## 2022-09-16 RX ORDER — CEFTRIAXONE 500 MG/1
1000 INJECTION, POWDER, FOR SOLUTION INTRAMUSCULAR; INTRAVENOUS EVERY 24 HOURS
Refills: 0 | Status: DISCONTINUED | OUTPATIENT
Start: 2022-09-17 | End: 2022-09-16

## 2022-09-16 RX ORDER — ACETAMINOPHEN 500 MG
2 TABLET ORAL
Qty: 40 | Refills: 0
Start: 2022-09-16 | End: 2022-09-20

## 2022-09-16 RX ORDER — SODIUM CHLORIDE 9 MG/ML
1000 INJECTION, SOLUTION INTRAVENOUS
Refills: 0 | Status: DISCONTINUED | OUTPATIENT
Start: 2022-09-16 | End: 2022-09-16

## 2022-09-16 RX ORDER — CEFTRIAXONE 500 MG/1
INJECTION, POWDER, FOR SOLUTION INTRAMUSCULAR; INTRAVENOUS
Refills: 0 | Status: DISCONTINUED | OUTPATIENT
Start: 2022-09-16 | End: 2022-09-16

## 2022-09-16 RX ORDER — CEPHALEXIN 500 MG
1 CAPSULE ORAL
Qty: 6 | Refills: 0
Start: 2022-09-16 | End: 2022-09-18

## 2022-09-16 RX ORDER — LIDOCAINE 4 G/100G
1 CREAM TOPICAL ONCE
Refills: 0 | Status: COMPLETED | OUTPATIENT
Start: 2022-09-16 | End: 2022-09-16

## 2022-09-16 RX ORDER — CEFTRIAXONE 500 MG/1
1000 INJECTION, POWDER, FOR SOLUTION INTRAMUSCULAR; INTRAVENOUS ONCE
Refills: 0 | Status: COMPLETED | OUTPATIENT
Start: 2022-09-16 | End: 2022-09-16

## 2022-09-16 RX ADMIN — LIDOCAINE 1 PATCH: 4 CREAM TOPICAL at 17:44

## 2022-09-16 RX ADMIN — HEPARIN SODIUM 5000 UNIT(S): 5000 INJECTION INTRAVENOUS; SUBCUTANEOUS at 15:10

## 2022-09-16 RX ADMIN — LIDOCAINE 1 PATCH: 4 CREAM TOPICAL at 07:57

## 2022-09-16 RX ADMIN — CEFTRIAXONE 100 MILLIGRAM(S): 500 INJECTION, POWDER, FOR SOLUTION INTRAMUSCULAR; INTRAVENOUS at 06:44

## 2022-09-16 RX ADMIN — Medication 300 MILLIGRAM(S): at 01:53

## 2022-09-16 RX ADMIN — SODIUM CHLORIDE 125 MILLILITER(S): 9 INJECTION, SOLUTION INTRAVENOUS at 07:02

## 2022-09-16 RX ADMIN — LIDOCAINE 1 PATCH: 4 CREAM TOPICAL at 04:32

## 2022-09-16 RX ADMIN — ONDANSETRON 4 MILLIGRAM(S): 8 TABLET, FILM COATED ORAL at 04:29

## 2022-09-16 RX ADMIN — AMLODIPINE BESYLATE 5 MILLIGRAM(S): 2.5 TABLET ORAL at 05:10

## 2022-09-16 NOTE — DISCHARGE NOTE PROVIDER - NSDCCPTREATMENT_GEN_ALL_CORE_FT
PRINCIPAL PROCEDURE  Procedure: Laparoscopic left radical nephrectomy  Findings and Treatment:

## 2022-09-16 NOTE — PROGRESS NOTE ADULT - SUBJECTIVE AND OBJECTIVE BOX
UROLOGY DAILY PROGRESS NOTE:     Subjective: Patient seen and examined at bedside. c/o nausea and had one episode of emesis this morning.  Pain controlled, + flatus       Objective:  Vital signs  T(F): , Max: 98.2 (09-15-22 @ 16:09)  HR: 69 (09-16-22 @ 05:00)  BP: 147/90 (09-16-22 @ 05:00)  SpO2: 93% (09-16-22 @ 05:00)  Wt(kg): --    I&O's Summary    15 Sep 2022 07:01  -  16 Sep 2022 07:00  --------------------------------------------------------  IN: 370 mL / OUT: 650 mL / NET: -280 mL        Gen: NAD  Pulm: No respiratory distress, no subcostal retractions  CV: RRR, no JVD  Abd: Soft, NT, ND, incisions CDI  : Uribe- clear urine     Labs:                Urine Cx:  
 Post op Check    Pt seen and examined. Pain is controlled with medications. Endorses nausea, improved with zofran. Denies SOB/CP/V.     Vital Signs Last 24 Hrs  T(C): 36.5 (15 Sep 2022 22:15), Max: 36.8 (15 Sep 2022 16:09)  T(F): 97.7 (15 Sep 2022 22:15), Max: 98.2 (15 Sep 2022 16:09)  HR: 51 (15 Sep 2022 23:30) (51 - 79)  BP: 144/75 (15 Sep 2022 23:30) (142/87 - 174/83)  BP(mean): 103 (15 Sep 2022 23:30) (103 - 119)  RR: 16 (15 Sep 2022 23:30) (16 - 18)  SpO2: 100% (15 Sep 2022 23:30) (93% - 100%)    Parameters below as of 15 Sep 2022 23:30  Patient On (Oxygen Delivery Method): room air,pulse oximeter now on right earlobe        I&O's Summary    15 Sep 2022 07:01  -  15 Sep 2022 23:47  --------------------------------------------------------  IN: 125 mL / OUT: 75 mL / NET: 50 mL    I&O's Detail    15 Sep 2022 07:01  -  15 Sep 2022 23:48  --------------------------------------------------------  IN:    Lactated Ringers: 125 mL  Total IN: 125 mL    OUT:    Indwelling Catheter - Urethral (mL): 75 mL  Total OUT: 75 mL    Total NET: 50 mL          Physical Exam  Gen: awake alert NAD AXOX3  Pulm: No respiratory distress  Abd: Soft, moderate TTP to incision sites, left sided incision sites healing well without active bleeding, ND  : perez in place draining clear yellow urine

## 2022-09-16 NOTE — DISCHARGE NOTE PROVIDER - NSDCFUSCHEDAPPT_GEN_ALL_CORE_FT
[facility] [address]  Scheduled Appointment: 09/23/2022

## 2022-09-16 NOTE — DISCHARGE NOTE PROVIDER - NSDCMRMEDTOKEN_GEN_ALL_CORE_FT
amLODIPine 5 mg oral tablet: 1 tab(s) orally once a day  atorvastatin 10 mg oral tablet: 1 tab(s) orally once a day  omeprazole 20 mg oral tablet, disintegrating, delayed release:   Tylenol Extra Strength 500 mg oral tablet: 1-2 tab(s) orally every 6 hours as needed for pain MDD:4 grams   amLODIPine 5 mg oral tablet: 1 tab(s) orally once a day  atorvastatin 10 mg oral tablet: 1 tab(s) orally once a day  cephalexin 500 mg oral tablet: 1 tab(s) orally 2 times a day   omeprazole 20 mg oral tablet, disintegrating, delayed release:   Tylenol Extra Strength 500 mg oral tablet: 1-2 tab(s) orally every 6 hours as needed for pain MDD:4 grams

## 2022-09-16 NOTE — DISCHARGE NOTE PROVIDER - NSDCFUADDINST_GEN_ALL_CORE_FT
Drink plenty of fluids.  No heavy lifting (greater than 10 pounds) or straining for 4 to 6 weeks.  You may shower, just pat white strips dry, they will fall off in a few weeks. Do not drive if taking narcotic pain medication. If you pain is not controlled, please contact your urologist.  Call Dr. Farley's office  to schedule a follow up appointment.  Call the office if you have fever greater than 101, difficulty urinating, pain not relieved with pain medication, nausea/vomiting.

## 2022-09-16 NOTE — DISCHARGE NOTE NURSING/CASE MANAGEMENT/SOCIAL WORK - PATIENT PORTAL LINK FT
You can access the FollowMyHealth Patient Portal offered by [facility] by registering at the following website: [email or web address]. By joining [employer]’s FollowMyHealth portal, you will also be able to view your health information using other applications (apps) compatible with our system.

## 2022-09-16 NOTE — DISCHARGE NOTE NURSING/CASE MANAGEMENT/SOCIAL WORK - NSDCPEFALRISK_GEN_ALL_CORE
For information on Fall & Injury Prevention, visit: [email or web address] OR  [email or web address] OR  https://www.cdc.gov/steadi/patient.html

## 2022-09-16 NOTE — DISCHARGE NOTE PROVIDER - CARE PROVIDER_API CALL
Myron Farley)  Urology  [address], [address]  [place] [address]  Phone: [phone]  Fax: [phone]  Follow Up Time:

## 2022-09-16 NOTE — DISCHARGE NOTE PROVIDER - HOSPITAL COURSE
80yo woman underwent left laparoscopic nephrectomy on 9/15/22. Uribe removed POD1 and patient passed trial of void. Return of GI function on POD1, diet advanced without incident. At the time of discharge, the patient was hemodynamically stable, was tolerating PO diet, was voiding urine, was ambulating, and was comfortable with adequate pain control. The patient was instructed to follow up with Dr. Farley after discharge from the hospital. The patient felt comfortable with discharge. The patient was discharged to home. The patient had no other issues.

## 2022-09-23 ENCOUNTER — NON-APPOINTMENT (OUTPATIENT)
Age: 82
End: 2022-09-23

## 2022-09-23 ENCOUNTER — APPOINTMENT (OUTPATIENT)
Dept: INTERNAL MEDICINE | Facility: CLINIC | Age: 82
End: 2022-09-23

## 2022-09-23 VITALS — DIASTOLIC BLOOD PRESSURE: 80 MMHG | HEART RATE: 70 BPM | RESPIRATION RATE: 16 BRPM | SYSTOLIC BLOOD PRESSURE: 120 MMHG

## 2022-09-23 PROBLEM — I10 ESSENTIAL (PRIMARY) HYPERTENSION: Chronic | Status: ACTIVE | Noted: 2022-09-15

## 2022-09-23 PROBLEM — E78.5 HYPERLIPIDEMIA, UNSPECIFIED: Chronic | Status: ACTIVE | Noted: 2022-09-15

## 2022-09-23 PROBLEM — C44.90 UNSPECIFIED MALIGNANT NEOPLASM OF SKIN, UNSPECIFIED: Chronic | Status: ACTIVE | Noted: 2022-09-15

## 2022-09-23 PROCEDURE — 99214 OFFICE O/P EST MOD 30 MIN: CPT

## 2022-09-23 NOTE — HISTORY OF PRESENT ILLNESS
[FreeTextEntry1] : FU after admission to [facility] - had left nephrectomy for renal cell cancer\par Had left nephrectomy - - did well\par Eating OK\par Moving her bowels\par Breathing OK\par No n,v\par No fever\par Urinating OK\par

## 2022-09-23 NOTE — ASSESSMENT
[FreeTextEntry1] : Pt doing well sp nephrectomy - to continue meds\par Will fu in 2 months for CC\par

## 2022-09-29 ENCOUNTER — NON-APPOINTMENT (OUTPATIENT)
Age: 82
End: 2022-09-29

## 2022-10-06 RX ORDER — OMEPRAZOLE 20 MG/1
20 CAPSULE, DELAYED RELEASE ORAL
Qty: 90 | Refills: 2 | Status: ACTIVE | COMMUNITY
Start: 2019-07-16

## 2022-10-06 RX ORDER — MULTIVITAMIN
TABLET ORAL
Refills: 0 | Status: ACTIVE | COMMUNITY
Start: 2020-08-13

## 2022-10-06 RX ORDER — CHOLECALCIFEROL (VITAMIN D3) 25 MCG
25 MCG TABLET ORAL DAILY
Qty: 90 | Refills: 2 | Status: DISCONTINUED | COMMUNITY
Start: 2022-02-19 | End: 2022-10-06

## 2022-10-06 NOTE — PROGRESS NOTE ADULT - ASSESSMENT
A/P: 81y Female s/p L lap nephrectomy POD1  - Labs  -DVT prophylaxis/OOB  -Incentive spirometry  -Strict I&O's  -Analgesia and antiemetics as needed  -Clear liquid Diet- monitor nausea   -AM TOV   - Maint IVF 
Patient notified.  Patient verbalized understanding of the information of the information given.  No further questions at this time    Electronically Signed by:    Katherine Nieto CMA , 10/6/2022    
A/P: 81y Female s/p L lap nephrectomy     -DVT prophylaxis/OOB  -Incentive spirometry  -Strict I&O's  -Analgesia and antiemetics as needed  -Clear liquid Diet  -AM labs  -AM TOV

## 2022-10-07 ENCOUNTER — OUTPATIENT (OUTPATIENT)
Dept: OUTPATIENT SERVICES | Facility: HOSPITAL | Age: 82
LOS: 1 days | Discharge: HOME | End: 2022-10-07

## 2022-10-10 NOTE — CONSULT LETTER
[Dear  ___] : Dear  [unfilled], [Consult Letter:] : I had the pleasure of evaluating your patient, [unfilled]. [Please see my note below.] : Please see my note below. [Consult Closing:] : Thank you very much for allowing me to participate in the care of this patient.  If you have any questions, please do not hesitate to contact me. [Sincerely,] : Sincerely, [DrChrissie  ___] : Dr. JOHNSON

## 2022-10-11 ENCOUNTER — NON-APPOINTMENT (OUTPATIENT)
Age: 82
End: 2022-10-11

## 2022-10-11 ENCOUNTER — APPOINTMENT (OUTPATIENT)
Dept: HEMATOLOGY ONCOLOGY | Facility: CLINIC | Age: 82
End: 2022-10-11

## 2022-10-11 VITALS
BODY MASS INDEX: 24.73 KG/M2 | SYSTOLIC BLOOD PRESSURE: 157 MMHG | TEMPERATURE: 97.4 F | RESPIRATION RATE: 16 BRPM | HEART RATE: 76 BPM | HEIGHT: 63.58 IN | OXYGEN SATURATION: 98 % | WEIGHT: 141.29 LBS | DIASTOLIC BLOOD PRESSURE: 91 MMHG

## 2022-10-11 DIAGNOSIS — Z87.891 PERSONAL HISTORY OF NICOTINE DEPENDENCE: ICD-10-CM

## 2022-10-11 PROCEDURE — 99205 OFFICE O/P NEW HI 60 MIN: CPT

## 2022-10-11 NOTE — PHYSICAL EXAM
[Fully active, able to carry on all pre-disease performance without restriction] : Status 0 - Fully active, able to carry on all pre-disease performance without restriction [Normal] : affect appropriate [de-identified] : no edema [de-identified] : well healed scars

## 2022-10-11 NOTE — ASSESSMENT
[Palliative Care Plan] : not applicable at this time [FreeTextEntry1] : Makayla Gomez is seen in the office in consultation today.  She is accompanied by her .  She is referred by Dr. Farley.  She was having some minor abdominal discomfort for a brief time and she was seen by a gastroenterologist first.  She had a sonogram of the abdomen revealing a tumor within the left kidney.  A CT scan was then performed and this revealed a heterogeneously enhancing solid exophytic mass involving the left mid to lower pole posterior kidney measuring 6 x 5 x 5.7 cm.  There was no history of hematuria.  There is no weight loss.  There is no loss of appetite before the diagnosis.  She has a history of breast cancer and she underwent a left lumpectomy along with radiation therapy in 2006.  She was on Arimidex for 5 years subsequently.  Blood work from August 24 revealed a normal neutrophil count, platelet count, and no evidence of anemia.  Her calcium was normal.  The BUN was 18 with a creatinine of 0.9.  The chemistry panel was normal.\par \par On September 15, she underwent a left radical nephrectomy revealing clear-cell conventional renal cell carcinoma, nuclear grade 4.  The tumor was 5.5 x 4.5 x 4 cm, with some rhabdoid features.  Tumor extended into the renal sinus as well into the veins.  Lymphovascular invasion was not noted.  A recent CT scan was performed which revealed evidence of bronchiectasis with some mucus plugging.  She says that when she was diagnosed with her breast cancer, similar findings were noted and she was seen by a pulmonologist and told that it was simply mucous plugging.\par \par A comprehensive history was obtained, and a physical examination was performed.  She did lose some weight after the surgery, but has regained weight.  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  There were no headaches dizziness or balance issues.  There were no paresthesias.  She does have some easy bruisability at times.\par \par The images from her radiologic studies were not available for review.  The CT of the chest was done without contrast.  There was no suspicious lymphadenopathy.  There was mild bilateral bronchiectasis.  There was a 9 x 4 mm ovoid slightly increased density lesion in the right upper lobe likely representing mucoid impaction.  This corresponded to a density seen on the regular chest x-ray.  A CT urogram was performed on August 30 demonstrating the mass.  There were no other findings present.\par \par We had a lengthy discussion about renal cell carcinoma.  We discussed the staging of renal cell carcinoma.  They were confused about stage III designation.  We discussed what histologic grade means in terms of risk of renal cancer recurrence.  Patients with a T3a grade 4 tumor have a very high risk of recurrence, and the standard of therapy until recent times was observation.\par \par In June 2021, the adjuvant trial of pembrolizumab was published in the [place] Journal of Medicine.  This trial had approximately 750 people in each arm.  It was a placebo-controlled trial and the primary endpoint was disease-free survival.  The hazard ratio of 0.68 favor the use of pembrolizumab with the P value of 0.002, and there was an approximate 9% difference in survival at the 12 and 24-month follow-up times.  It appears that overall survival be impacted as well, but there are not enough events that have occurred as yet in order to definitively say this.  In December of last year, pembrolizumab gained FDA authorization for adjuvant therapy in high risk resected renal cell carcinoma.\par \par We discussed how other adjuvant trials had failed.  The S-TRAC trial of Sutent as adjuvant in clear-cell cancer patients only did show a 14-month benefit in terms of disease-free survival, but it is unlikely that any overall survival advantage will be noted from this trial.  Patient's had to take 1 year of Sutent for this benefit, and it is labeled a category 2B recommendation by the NCCN but most physicians to treat renal cell carcinoma on a regular basis do not offer this treatment due to the toxicity and unproven benefit on overall survival.\par \par We discussed the logistics of administration of pembrolizumab in detail.  We discussed the mechanism of action as well as the adverse effects that may occur, both expected as well as unexpected.  They were given written information about pembrolizumab.  Some patients received pembrolizumab without any significant toxic events where as others may have difficulty.\par \par I also discussed a clinical trial that we have which was recently launched.  In this trial, both arms received pembrolizumab.  All patients are high risk resected without evidence of metastases.  Pembrolizumab plus an oral placebo was given in 1 arm, and the investigational arm gives pembrolizumab along with belzutifan orally.\par \par Belzutifan was approved last year for use in patients with von Hippel-Lindau disease even in the absence of metastatic disease.  It has shown benefit in refractory renal cell carcinoma patients in a trial published in nature medicine in February 2021.  Responders had durable responses with several patients on treatment for more than 2 years.\par \par This trial is trying to improve on the outcomes of the pembrolizumab alone trial, and I discussed this with her.  I have made arrangements for them to return to see me in approximately 2 weeks.  All adjuvant trials and renal cell carcinoma have given treatment within 12 weeks of the nephrectomy.\par \par All questions were answered to the best of my ability and to their apparent satisfaction.

## 2022-10-11 NOTE — REVIEW OF SYSTEMS
[Recent Change In Weight] : ~T recent weight change [Joint Pain] : joint pain [Anxiety] : anxiety [Easy Bruising] : a tendency for easy bruising [Negative] : Cardiovascular [Fever] : no fever [Chills] : no chills [Fatigue] : no fatigue [Wheezing] : no wheezing [Cough] : no cough [SOB on Exertion] : no shortness of breath during exertion [Abdominal Pain] : no abdominal pain [Vomiting] : no vomiting [Constipation] : no constipation [Diarrhea] : no diarrhea [Dysuria] : no dysuria [Incontinence] : no incontinence [Joint Stiffness] : no joint stiffness [Muscle Pain] : no muscle pain [Skin Rash] : no skin rash [Depression] : no depression [Hot Flashes] : no hot flashes [Muscle Weakness] : no muscle weakness [Easy Bleeding] : no tendency for easy bleeding [FreeTextEntry9] : hand arthritis  [de-identified] : No HA, no paresthesias [de-identified] : N

## 2022-10-11 NOTE — HISTORY OF PRESENT ILLNESS
[Disease: _____________________] : Disease: [unfilled] [T: ___] : T[unfilled] [N: ___] : N[unfilled] [M: ___] : M[unfilled] [AJCC Stage: ____] : AJCC Stage: [unfilled] [de-identified] : Makayla Gomez is seen in consultation for 11th, 2022.   Review of notes from her urologist revealed that she presented with abdominal discomfort and was seen by gastroenterology first.  Sonogram was performed and this revealed a 4.5 x 5.3 x 5.2 cm heterogeneous vascular mass in the left lower renal pole.\par A CT scan was performed and this revealed a heterogeneously enhancing solid exophytic mass involving the left mid to lower posterior kidney measuring 6 x 5 x 5.7 cm. No hematuria, no weight loss, no loss appetite before diagnosis. \par \par She has a history of breast cancer and underwent a left lumpectomy and radiation therapy in 2006. Surgery, radiation and Arimidex for 5 years.  On August 24, her hemoglobin, neutrophil count, and platelet count were normal.  Calcium level was normal.  The BUN was 18 with a creatinine of 0.9.  Transaminase levels were normal.  The albumin was 4.4 g.\par \par On September 15 she underwent a left radical nephrectomy revealing clear-cell conventional renal cell carcinoma, nuclear grade 4.  The tumor was 5.5 x 4.5 x 4 cm, grade 4 with rhabdoid features.  Tumor extended into the renal sinus and into major veins.  Tumor necrosis was present at approximately 5%.  All margins were negative.  No lymph nodes were submitted.  Lymphovascular invasion was not noted.  This is a pathologic T3a tumor.\par aleksey Presents today for evaluation with her . Lost about 10 pounds with the surgery, regained a few. NO pains, NO cough, no ROJAS. NO fatigue. No headaches, no dizziness, no balance issues.  [de-identified] : Grade 4

## 2022-10-11 NOTE — RESULTS/DATA
[FreeTextEntry1] : Final Diagnosis\par Kidney, left, radical nephrectomy:\par - Clear cell (conventional) renal carcinoma, nuclear grade 4\par - See synoptic summary below\par  \par Verified by: Yuriy Wolf MD\par (Electronic Signature)\par Reported on: 09/21/22 18:41 EDT, [facility], [address] [place] [address]. [place] [address]\par Phone: [phone]   Fax: [phone]\par _________________________________________________________________\par \par Synoptic Summary\par KIDNEY: Nephrectomy                                           \par SPECIMEN\par Procedure: Total nephrectomy\par Specimen Laterality: Left\par TUMOR\par Tumor Focality: Unifocal\par Tumor Site: Lower pole\par Tumor Size: Greatest Dimension (Centimeters) - 5.5 x 4.5 x 4.0 cm\par Histologic Type: Clear cell renal cell carcinoma\par Histologic Grade (WHO / ISUP): G4 (rhabdoid features)\par Tumor Extent: Extends into renal sinus, Extends into major vein (renal vein or its segmental branches)\par Sarcomatoid Features: Not identified\par Rhabdoid Features: Present\par Tumor Necrosis: Present\par Percentage of Tumor Necrosis: 5%\par Lymphovascular Invasion: Not identified\par MARGINS\par Margin Status: All margins negative for invasive carcinoma\par REGIONAL LYMPH NODES\par Regional Lymph Node Status: Not applicable (no regional lymph nodes submitted or found)\par DISTANT METASTASIS\par Distant Site(s) Involved: Not applicable\par PATHOLOGIC STAGE CLASSIFICATION (pTNM, AJCC 8th Edition)\par Reporting of pT, pN, and (when applicable) pM categories is based on information available to the pathologist at the time the report is issued. As per the AJCC (Chapter 1, 8th Ed.) it is the managing physician's responsibility to establish the final pathologic stage based upon all pertinent information, including but potentially not limited to this pathology report.\par TNM Descriptors: Not applicable\par Primary Tumor (pT): pT3a\par Regional Lymph Nodes (pN): pN not assigned (no nodes submitted or found)\par Distant Metastasis (pM): Not applicable \par ******************************************************\par CT scan is dated September 15, 2022, performed at an outside radiology center.  A CT scan of the chest was done without contrast.  Heterogeneous thyroid gland with the right lobe greater than the left side noted.  There are postsurgical changes in the left breast.  There was no suspicious lymphadenopathy.  There was mild bilateral bronchiectasis.  There was a 9 x 4 mm ovoid slightly increased density lesion in the right upper lobe likely representing mucoid impaction, corresponding to the density seen on the regular chest x-ray.  Linear branching opacity in the inferior right middle lobe likely represents mucous plugging.  Calcified and noncalcified tree-in-bud opacities were noted in the left lower lobe.  Additional scattered subcentimeter calcified and noncalcified pulmonary nodules were noted.  Fibrotic changes were noted in the anterior left upper lobe, likely related to the prior radiation therapy.  There were no suspicious osseous lesions.\par A CT urogram was performed on August 30, 2022.  There was mild bilateral perinephric fat stranding which was nonspecific.  Heterogeneously enhancing solid exophytic mass involving the left mid to lower posterior kidney measured 6 x 5 x 5.7.  The mass demonstrates central areas of hypodensity/necrosis.  A vessel was noted abutting the left medial mid pole of the kidney in the region of the mass.  Small subcentimeter hypodense lesions are demonstrated in the left lower renal pole measuring up to 5 mm, too small to characterize.  There is no hydronephrosis.  The ureters appeared normal.  There was streak artifact from his right hip hardware impeding interpretation of the bladder.  It was also under distended.  It was low-lying and suggestive of prolapse/cystocele.  The liver was normal.  There was no steatosis.  The adrenal glands were normal.  There was no thickening or nodularity.  There was no suspicious adenopathy.

## 2022-10-24 ENCOUNTER — APPOINTMENT (OUTPATIENT)
Dept: HEMATOLOGY ONCOLOGY | Facility: CLINIC | Age: 82
End: 2022-10-24

## 2022-10-24 ENCOUNTER — NON-APPOINTMENT (OUTPATIENT)
Age: 82
End: 2022-10-24

## 2022-10-24 VITALS
OXYGEN SATURATION: 98 % | BODY MASS INDEX: 25.08 KG/M2 | HEART RATE: 77 BPM | RESPIRATION RATE: 16 BRPM | SYSTOLIC BLOOD PRESSURE: 162 MMHG | TEMPERATURE: 97.7 F | DIASTOLIC BLOOD PRESSURE: 80 MMHG | WEIGHT: 144.18 LBS

## 2022-10-24 PROCEDURE — 99215 OFFICE O/P EST HI 40 MIN: CPT

## 2022-10-24 RX ORDER — CEFADROXIL 500 MG/1
500 CAPSULE ORAL
Qty: 6 | Refills: 0 | Status: DISCONTINUED | COMMUNITY
Start: 2022-09-14

## 2022-10-24 RX ORDER — CEPHALEXIN 500 MG/1
500 CAPSULE ORAL
Qty: 6 | Refills: 0 | Status: DISCONTINUED | COMMUNITY
Start: 2022-09-16

## 2022-10-24 NOTE — HISTORY OF PRESENT ILLNESS
[Disease: _____________________] : Disease: [unfilled] [T: ___] : T[unfilled] [N: ___] : N[unfilled] [M: ___] : M[unfilled] [AJCC Stage: ____] : AJCC Stage: [unfilled] [Date: ____________] : Patient's last distress assessment performed on [unfilled]. [8 - Distress Level] : Distress Level: 8 [Patient given social work contact information and resource sheet] : Patient was given social work contact information and resource sheet [de-identified] : Makayla Gomez is seen in consultation for 11th, 2022.   Review of notes from her urologist revealed that she presented with abdominal discomfort and was seen by gastroenterology first.  Sonogram was performed and this revealed a 4.5 x 5.3 x 5.2 cm heterogeneous vascular mass in the left lower renal pole.\par A CT scan was performed and this revealed a heterogeneously enhancing solid exophytic mass involving the left mid to lower posterior kidney measuring 6 x 5 x 5.7 cm. No hematuria, no weight loss, no loss appetite before diagnosis. \par \par She has a history of breast cancer and underwent a left lumpectomy and radiation therapy in 2006. Surgery, radiation and Arimidex for 5 years.  On August 24, her hemoglobin, neutrophil count, and platelet count were normal.  Calcium level was normal.  The BUN was 18 with a creatinine of 0.9.  Transaminase levels were normal.  The albumin was 4.4 g.\par \par On September 15 she underwent a left radical nephrectomy revealing clear-cell conventional renal cell carcinoma, nuclear grade 4.  The tumor was 5.5 x 4.5 x 4 cm, grade 4 with rhabdoid features.  Tumor extended into the renal sinus and into major veins.  Tumor necrosis was present at approximately 5%.  All margins were negative.  No lymph nodes were submitted.  Lymphovascular invasion was not noted.  This is a pathologic T3a tumor.\par aleksey Presents today for evaluation with her . Lost about 10 pounds with the surgery, regained a few. NO pains, NO cough, no ROJAS. NO fatigue. No headaches, no dizziness, no balance issues.  [de-identified] : Grade 4 [de-identified] : 10/24/22....feels well physically well, but anxious. Appetite is good, regained some weight, had lost 10 with the surgery., No N/V/D/C. No headaches, no dizziness, no balance issues. No cough, no ROJAS. No fever, no chills. No edema. no chest pain/pressure/palpitations. No fatigue, no notable pains other than arthritic issues.  [FreeTextEntry7] : anxiety wit the surgery and diagnosis

## 2022-10-24 NOTE — ASSESSMENT
[Palliative Care Plan] : not applicable at this time [FreeTextEntry1] : Makayla Gomez is seen in the office in follow-up again today.  She reports that she feels physically well, but she remains anxious.  Her appetite is good and she is regaining some weight.  She had lost about 10 pounds with the surgery.  There were no GI complaints.  She denies any headaches or dizziness.  There were no balance issues.  There is no cough or shortness of breath but she has some occasional morning sputum which she clears from her throat.  There were no fevers or chills.  There is no edema of the extremities.  She denies any chest pain chest pressure or palpitations.  She reports no fatigue.  Her only pains are arthritic issues.  She has occasional rare cramps in the muscles of the legs.\par \par On physical examination, she appears well.  Her blood pressure was 162/80 today, and her weight is 65.4 kg.  On September 13 it was 64 kg.  The HEENT examination is normal.  There is no palpable adenopathy present.  The chest is clear and the heart examination is normal.  There is no palpable abnormality upon examination of the abdomen.  The extremities are normal.  There is no spinal column or chest wall tenderness to palpation or percussion.\par \par Her imaging studies were performed in outside radiology center.  I went over the results of that report.  She tells me that many years ago, she was being followed by Tamica Trejo in [place], a pulmonologist whom I know, and she was told that she did not require any further follow-up.  She was told that she had mucous plugging previously.\par \par Once again discussed recommendations for adjuvant therapy for her clear-cell carcinoma.  Her tumor was T3, grade 4 we once again went over what these issues mean.  Her and her  had multiple questions to ask.  She is at high risk of recurrent disease.  We went over the results of the trial that established pembrolizumab as adjuvant therapy.  It was approved late last year for patients with intermediate to high risk for high risk renal cell carcinoma and also for patients who had metastatic disease but had resection of the metastases.  We went over the magnitude of benefit, which is approximately 9% absolute and I explained what this meant.  That 9% fewer patients had about 100 treated would relapse.  The data is immature at this point for overall survival benefit, and only a few patients were beyond 3 years of follow-up at the time of the publication.  It was presented at the ASCO meeting in June 2021, and it was published in September 2021.  We went over the adverse effects that may occur as well as logistics of treatment.  This is the standard treatment for patients in her situation.\par \par We then discussed the clinical trial that is available in this trial adds belzutifan which is an oral HIF 2 antagonist we went over the adverse effects of this medication as well.  The medication is approved for patients with von Hippel-Lindau disease, and even in patients who do not have metastatic renal cell carcinoma in that situation.  It is also been shown to have a benefits in patients who have been heavily pretreated previously with many other medications we would not would not expect significant duration of benefit.  In this research paper published in nature medicine in February 2021, a significant portion of patient's continued on therapy in excess of 2 years at the time of that publication.  This is prompted utilizing the medication earlier on in the course of therapy and I currently have trials with this medication for second or third line metastatic renal cell carcinoma treatment as well as first-line metastatic renal cell carcinoma treatment\par \par We discussed this issue for considerable time and I asked many questions from both her and her  in many facets of this proposed trial.  They did inform me that they are being seen a second opinion next Tuesday at [facility].\par \par Thus far as of last Friday, 45 patients had been screened in the [place], and I believe 24 patients had screen failed, most likely as a result of small pulmonary nodules that were deemed to be possibly significant by the independent radiology review process.\par \par After her second opinion at [facility], she will inform me and let me know if she is going to be followed here or with them.\par \par All questions were answered to the best my ability and to their apparent satisfaction.

## 2022-10-24 NOTE — REVIEW OF SYSTEMS
[Joint Pain] : joint pain [Anxiety] : anxiety [Easy Bruising] : a tendency for easy bruising [Negative] : ENT [Fever] : no fever [Chills] : no chills [Fatigue] : no fatigue [Recent Change In Weight] : ~T no recent weight change [Chest Pain] : no chest pain [Palpitations] : no palpitations [Lower Ext Edema] : no lower extremity edema [Wheezing] : no wheezing [Cough] : no cough [SOB on Exertion] : no shortness of breath during exertion [Abdominal Pain] : no abdominal pain [Vomiting] : no vomiting [Constipation] : no constipation [Diarrhea] : no diarrhea [Dysuria] : no dysuria [Incontinence] : no incontinence [Joint Stiffness] : no joint stiffness [Muscle Pain] : no muscle pain [Skin Rash] : no skin rash [Dizziness] : no dizziness [Depression] : no depression [Muscle Weakness] : no muscle weakness [Easy Bleeding] : no tendency for easy bleeding [FreeTextEntry8] : no blood [FreeTextEntry9] : rare cramps [de-identified] : No HA, no paresthesias

## 2022-10-24 NOTE — RESULTS/DATA
[FreeTextEntry1] : Final Diagnosis\par Kidney, left, radical nephrectomy:\par - Clear cell (conventional) renal carcinoma, nuclear grade 4\par - See synoptic summary below\par  \par Verified by: Yuriy Wolf MD\par (Electronic Signature)\par Reported on: 09/21/22 18:41 EDT, [facility], [place], [address] [place] [address]. [place] [address]\par Phone: [phone]   Fax: [phone]\par _________________________________________________________________\par \par Synoptic Summary\par KIDNEY: Nephrectomy                                           \par SPECIMEN\par Procedure: Total nephrectomy\par Specimen Laterality: Left\par TUMOR\par Tumor Focality: Unifocal\par Tumor Site: Lower pole\par Tumor Size: Greatest Dimension (Centimeters) - 5.5 x 4.5 x 4.0 cm\par Histologic Type: Clear cell renal cell carcinoma\par Histologic Grade (WHO / ISUP): G4 (rhabdoid features)\par Tumor Extent: Extends into renal sinus, Extends into major vein (renal vein or its segmental branches)\par Sarcomatoid Features: Not identified\par Rhabdoid Features: Present\par Tumor Necrosis: Present\par Percentage of Tumor Necrosis: 5%\par Lymphovascular Invasion: Not identified\par MARGINS\par Margin Status: All margins negative for invasive carcinoma\par REGIONAL LYMPH NODES\par Regional Lymph Node Status: Not applicable (no regional lymph nodes submitted or found)\par DISTANT METASTASIS\par Distant Site(s) Involved: Not applicable\par PATHOLOGIC STAGE CLASSIFICATION (pTNM, AJCC 8th Edition)\par Reporting of pT, pN, and (when applicable) pM categories is based on information available to the pathologist at the time the report is issued. As per the AJCC (Chapter 1, 8th Ed.) it is the managing physician's responsibility to establish the final pathologic stage based upon all pertinent information, including but potentially not limited to this pathology report.\par TNM Descriptors: Not applicable\par Primary Tumor (pT): pT3a\par Regional Lymph Nodes (pN): pN not assigned (no nodes submitted or found)\par Distant Metastasis (pM): Not applicable \par ******************************************************\par CT scan is dated September 15, 2022, performed at an outside radiology center.  A CT scan of the chest was done without contrast.  Heterogeneous thyroid gland with the right lobe greater than the left side noted.  There are postsurgical changes in the left breast.  There was no suspicious lymphadenopathy.  There was mild bilateral bronchiectasis.  There was a 9 x 4 mm ovoid slightly increased density lesion in the right upper lobe likely representing mucoid impaction, corresponding to the density seen on the regular chest x-ray.  Linear branching opacity in the inferior right middle lobe likely represents mucous plugging.  Calcified and noncalcified tree-in-bud opacities were noted in the left lower lobe.  Additional scattered subcentimeter calcified and noncalcified pulmonary nodules were noted.  Fibrotic changes were noted in the anterior left upper lobe, likely related to the prior radiation therapy.  There were no suspicious osseous lesions.\par A CT urogram was performed on August 30, 2022.  There was mild bilateral perinephric fat stranding which was nonspecific.  Heterogeneously enhancing solid exophytic mass involving the left mid to lower posterior kidney measured 6 x 5 x 5.7.  The mass demonstrates central areas of hypodensity/necrosis.  A vessel was noted abutting the left medial mid pole of the kidney in the region of the mass.  Small subcentimeter hypodense lesions are demonstrated in the left lower renal pole measuring up to 5 mm, too small to characterize.  There is no hydronephrosis.  The ureters appeared normal.  There was streak artifact from his right hip hardware impeding interpretation of the bladder.  It was also under distended.  It was low-lying and suggestive of prolapse/cystocele.  The liver was normal.  There was no steatosis.  The adrenal glands were normal.  There was no thickening or nodularity.  There was no suspicious adenopathy.

## 2022-10-24 NOTE — PHYSICAL EXAM
[Fully active, able to carry on all pre-disease performance without restriction] : Status 0 - Fully active, able to carry on all pre-disease performance without restriction [Normal] : affect appropriate [de-identified] : no edema [de-identified] : suture removed from periumbilical area

## 2022-10-31 ENCOUNTER — NON-APPOINTMENT (OUTPATIENT)
Age: 82
End: 2022-10-31

## 2022-11-02 ENCOUNTER — NON-APPOINTMENT (OUTPATIENT)
Age: 82
End: 2022-11-02

## 2022-11-08 ENCOUNTER — NON-APPOINTMENT (OUTPATIENT)
Age: 82
End: 2022-11-08

## 2022-11-25 ENCOUNTER — RX RENEWAL (OUTPATIENT)
Age: 82
End: 2022-11-25

## 2022-11-29 ENCOUNTER — APPOINTMENT (OUTPATIENT)
Dept: INTERNAL MEDICINE | Facility: CLINIC | Age: 82
End: 2022-11-29

## 2022-12-02 ENCOUNTER — NON-APPOINTMENT (OUTPATIENT)
Age: 82
End: 2022-12-02

## 2022-12-03 ENCOUNTER — NON-APPOINTMENT (OUTPATIENT)
Age: 82
End: 2022-12-03

## 2022-12-06 ENCOUNTER — APPOINTMENT (OUTPATIENT)
Dept: INTERNAL MEDICINE | Facility: CLINIC | Age: 82
End: 2022-12-06

## 2022-12-06 VITALS
WEIGHT: 144 LBS | DIASTOLIC BLOOD PRESSURE: 80 MMHG | BODY MASS INDEX: 25.05 KG/M2 | RESPIRATION RATE: 14 BRPM | HEART RATE: 74 BPM | SYSTOLIC BLOOD PRESSURE: 140 MMHG

## 2022-12-06 PROCEDURE — 99213 OFFICE O/P EST LOW 20 MIN: CPT | Mod: 25

## 2022-12-06 PROCEDURE — 36415 COLL VENOUS BLD VENIPUNCTURE: CPT

## 2022-12-06 NOTE — PHYSICAL EXAM
[Normal Sclera/Conjunctiva] : normal sclera/conjunctiva [Thyroid Normal, No Nodules] : the thyroid was normal and there were no nodules present [Normal] : soft, non-tender, non-distended, no masses palpated, no HSM and normal bowel sounds [de-identified] : minimal trmors - but gets after drinking coffee

## 2022-12-06 NOTE — ASSESSMENT
[FreeTextEntry1] : Pt with low TSH level - but no real symptoms\par Has ho thyroid nodules\par ? thyroiditis or toxic nodule\par Bloods drawn in office.\par

## 2022-12-07 ENCOUNTER — NON-APPOINTMENT (OUTPATIENT)
Age: 82
End: 2022-12-07

## 2022-12-07 LAB
ERYTHROCYTE [SEDIMENTATION RATE] IN BLOOD BY WESTERGREN METHOD: 5 MM/HR
T3FREE SERPL-MCNC: 3.53 PG/ML
T4 FREE SERPL-MCNC: 1.4 NG/DL
TSH SERPL-ACNC: 0.08 UIU/ML

## 2023-01-27 ENCOUNTER — RX RENEWAL (OUTPATIENT)
Age: 83
End: 2023-01-27

## 2023-08-30 ENCOUNTER — RX RENEWAL (OUTPATIENT)
Age: 83
End: 2023-08-30

## 2023-10-07 ENCOUNTER — TRANSCRIPTION ENCOUNTER (OUTPATIENT)
Age: 83
End: 2023-10-07

## 2023-10-07 ENCOUNTER — APPOINTMENT (OUTPATIENT)
Dept: INTERNAL MEDICINE | Facility: CLINIC | Age: 83
End: 2023-10-07
Payer: MEDICARE

## 2023-10-07 ENCOUNTER — NON-APPOINTMENT (OUTPATIENT)
Age: 83
End: 2023-10-07

## 2023-10-07 DIAGNOSIS — U07.1 COVID-19: ICD-10-CM

## 2023-10-07 PROCEDURE — 99442: CPT | Mod: 95

## 2023-10-09 PROBLEM — U07.1 COVID-19 VIRUS INFECTION: Status: ACTIVE | Noted: 2023-10-07

## 2023-10-10 ENCOUNTER — APPOINTMENT (OUTPATIENT)
Dept: INTERNAL MEDICINE | Facility: CLINIC | Age: 83
End: 2023-10-10

## 2023-10-12 ENCOUNTER — NON-APPOINTMENT (OUTPATIENT)
Age: 83
End: 2023-10-12

## 2023-10-12 RX ORDER — DIAZEPAM 5 MG/1
5 TABLET ORAL
Qty: 30 | Refills: 0 | Status: ACTIVE | COMMUNITY
Start: 2019-10-15 | End: 1900-01-01

## 2023-10-27 ENCOUNTER — RX RENEWAL (OUTPATIENT)
Age: 83
End: 2023-10-27

## 2023-11-30 ENCOUNTER — APPOINTMENT (OUTPATIENT)
Dept: INTERNAL MEDICINE | Facility: CLINIC | Age: 83
End: 2023-11-30
Payer: MEDICARE

## 2023-11-30 ENCOUNTER — NON-APPOINTMENT (OUTPATIENT)
Age: 83
End: 2023-11-30

## 2023-11-30 VITALS
WEIGHT: 123 LBS | HEIGHT: 64 IN | HEART RATE: 80 BPM | SYSTOLIC BLOOD PRESSURE: 130 MMHG | RESPIRATION RATE: 16 BRPM | BODY MASS INDEX: 21 KG/M2 | DIASTOLIC BLOOD PRESSURE: 70 MMHG

## 2023-11-30 DIAGNOSIS — C64.2 MALIGNANT NEOPLASM OF LEFT KIDNEY, EXCEPT RENAL PELVIS: ICD-10-CM

## 2023-11-30 DIAGNOSIS — K51.80 OTHER ULCERATIVE COLITIS W/OUT COMPLICATIONS: ICD-10-CM

## 2023-11-30 DIAGNOSIS — R79.89 OTHER SPECIFIED ABNORMAL FINDINGS OF BLOOD CHEMISTRY: ICD-10-CM

## 2023-11-30 DIAGNOSIS — Z00.00 ENCOUNTER FOR GENERAL ADULT MEDICAL EXAMINATION W/OUT ABNORMAL FINDINGS: ICD-10-CM

## 2023-11-30 DIAGNOSIS — C50.912 MALIGNANT NEOPLASM OF UNSPECIFIED SITE OF LEFT FEMALE BREAST: ICD-10-CM

## 2023-11-30 DIAGNOSIS — K22.70 BARRETT'S ESOPHAGUS W/OUT DYSPLASIA: ICD-10-CM

## 2023-11-30 DIAGNOSIS — I10 ESSENTIAL (PRIMARY) HYPERTENSION: ICD-10-CM

## 2023-11-30 DIAGNOSIS — E78.5 HYPERLIPIDEMIA, UNSPECIFIED: ICD-10-CM

## 2023-11-30 PROCEDURE — 99214 OFFICE O/P EST MOD 30 MIN: CPT | Mod: 25

## 2023-11-30 PROCEDURE — 93000 ELECTROCARDIOGRAM COMPLETE: CPT

## 2023-11-30 PROCEDURE — 99497 ADVNCD CARE PLAN 30 MIN: CPT

## 2023-11-30 PROCEDURE — G0439: CPT

## 2023-11-30 RX ORDER — NIRMATRELVIR AND RITONAVIR 300-100 MG
20 X 150 MG & KIT ORAL
Qty: 1 | Refills: 0 | Status: DISCONTINUED | COMMUNITY
Start: 2023-10-07 | End: 2023-11-30

## 2023-11-30 RX ORDER — MULTIVIT-MIN/FOLIC/VIT K/LYCOP 400-300MCG
1000 TABLET ORAL
Refills: 0 | Status: DISCONTINUED | COMMUNITY
End: 2023-11-30

## 2023-11-30 RX ORDER — SENNOSIDES 8.6 MG
TABLET ORAL
Refills: 0 | Status: DISCONTINUED | COMMUNITY
End: 2023-11-30

## 2023-11-30 RX ORDER — MESALAMINE 400 MG/1
CAPSULE, DELAYED RELEASE ORAL
Refills: 0 | Status: ACTIVE | COMMUNITY

## 2023-11-30 RX ORDER — CHLORHEXIDINE GLUCONATE 4 %
1000 LIQUID (ML) TOPICAL
Refills: 0 | Status: DISCONTINUED | COMMUNITY
End: 2023-11-30

## 2024-07-25 ENCOUNTER — NON-APPOINTMENT (OUTPATIENT)
Age: 84
End: 2024-07-25

## 2024-07-29 ENCOUNTER — RX RENEWAL (OUTPATIENT)
Age: 84
End: 2024-07-29

## 2024-09-24 ENCOUNTER — NON-APPOINTMENT (OUTPATIENT)
Age: 84
End: 2024-09-24

## 2024-09-24 ENCOUNTER — APPOINTMENT (OUTPATIENT)
Dept: ORTHOPEDIC SURGERY | Facility: CLINIC | Age: 84
End: 2024-09-24
Payer: MEDICARE

## 2024-09-24 VITALS
SYSTOLIC BLOOD PRESSURE: 130 MMHG | BODY MASS INDEX: 20.49 KG/M2 | HEIGHT: 64 IN | DIASTOLIC BLOOD PRESSURE: 76 MMHG | WEIGHT: 120 LBS | HEART RATE: 83 BPM

## 2024-09-24 DIAGNOSIS — M25.552 PAIN IN LEFT HIP: ICD-10-CM

## 2024-09-24 DIAGNOSIS — M16.12 UNILATERAL PRIMARY OSTEOARTHRITIS, LEFT HIP: ICD-10-CM

## 2024-09-24 PROCEDURE — 73502 X-RAY EXAM HIP UNI 2-3 VIEWS: CPT | Mod: LT

## 2024-09-24 PROCEDURE — 99204 OFFICE O/P NEW MOD 45 MIN: CPT

## 2024-09-24 NOTE — HISTORY OF PRESENT ILLNESS
[Worsening] : worsening [8] : a current pain level of 8/10 [9] : an average pain level of 9/10 [Standing] : standing [Daily] : ~He/She~ states the symptoms seem to be occuring daily [Bending] : worsened by bending [Hip Movement] : worsened by hip movement [Walking] : worsened by walking [Acetaminophen] : relieved by acetaminophen [Rest] : relieved by rest [de-identified] : 83-year-old female presents for left hip pain which started about 2 to 3 months ago without any injury.  She states that since onset the pain is significantly worsened.  Now she rates the pain as an 8-9 out of 10 which is constant with any sort of movement.  Pain starts in the groin and can radiate into the quad area down to the knee.  She has significant difficulty with putting her socks and shoes on.  She is no longer able to walk long distances as she was in the past without significant pain or having to stop and rest.  She denies any history of injuries.  Occasionally she will take Tylenol with minimal improvement in symptoms.  She had the right hip done by another physician at [facility] about 15 years ago.  She recently entered remission for kidney cancer.  She was on immunotherapy which she has now completed and her scans came back with no evidence of cancer.  She presents today to see what her options are in regards to the left hip. Denies fevers, chills, chest pain, calf pain, shortness of breath.

## 2024-09-24 NOTE — END OF VISIT
[FreeTextEntry3] : I, Dr. Irizarry, personally performed the evaluation and management (E/M) services for this established patient who presents today with an exacerbation of an existing condition. That E/M includes conducting the clinically appropriate interval history &/or exam, assessing all new/exacerbated conditions, and establishing a new plan of care. Today, my AMMY, Db Sidhu PA-C, was here to observe my evaluation and management service for this new problem/exacerbated condition and follow the plan of care established by me going forward.

## 2024-09-24 NOTE — REASON FOR VISIT
[Initial Visit] : an initial visit for [Hip Pain] : hip pain [Spouse] : spouse [FreeTextEntry2] : Patient presents for evaluation of left hip pain.

## 2024-09-24 NOTE — PHYSICAL EXAM
[Antalgic] : antalgic [Cane] : ambulates with cane [LE] : Sensory: Intact in bilateral lower extremities [DP] : dorsalis pedis 2+ and symmetric bilaterally [Normal LLE] : Left Lower Extremity: No scars, rashes, lesions, ulcers, skin intact [Normal Touch] : sensation intact for touch [Normal] : no peripheral adenopathy appreciated [de-identified] : On physical exam of the left hip skin is warm and dry without erythema ecchymosis signs or symptoms of infection superficial or deep.  There is tenderness noted at the groin.  Range of motion is 90 degrees of flexion, 10 degrees of external rotation and 0 degrees of internal rotation with pain.  Strength is maintained in all planes.  Sensation is intact throughout the distal lower extremity.  There are 2+ dorsalis pedis pulse.  Leg lengths appear equal measured at the medial malleolus. Negative Lazara's exam [de-identified] : Two radiographs of the pelvis hip were performed today in the [place] office. Grade 4 bone-on-bone cartilaginous wear in the F- A joint of the left hip. Subchondral sclerosis noted on both sides the F-A  joint. Superior and inferior rim osteophytes are noted.

## 2024-09-24 NOTE — DISCUSSION/SUMMARY
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 Based upon the patients continued symptoms and failure to respond to 3 months of conservative treatment I have recommended a Left Total Hip Replacement for this patient. A long discussion took place with the patient describing what a total joint replacement is and what the procedure would entail. A Total Hip model, similar to the implant that will be used during the operation was utilized to demonstrate and to discuss the various bearing surfaces of the implants.   The benefits of surgery were discussed with the patient including the potential for improving their current clinical condition through operative intervention. Alternatives to surgical intervention including continued conservative management were also discussed in detail.   The hospitalization and post-operative care and rehabilitation were also discussed. The use of perioperative antibiotics and DVT prophylaxis were discussed. The risk, benefits and alternatives to a surgical intervention were discussed at length with the patient. The patient was also advised of risks related to the medical comorbidities and elevated body mass index (BMI). A lengthy discussion took place to review the most common complications including but not limited to: deep vein thrombosis, pulmonary embolus, heart attack, stroke, infection, wound breakdown, numbness, damage to nerves, tendon, muscles, arteries or other blood vessels, death and other possible complications from anesthesia. The patient was told that we will take steps to minimize these risks by using sterile technique, antibiotics and DVT prophylaxis when appropriate and follow the patient postoperatively in the office setting to monitor progress. The possibility of recurrent pain, no improvement in pain and actual worsening of pain were also discussed with the patient.   The nuances between posterior approach and anterior approach THR were reviewed in detail. Considering his clinical exam and X-Ray findings he does meet inclusion criteria for benefiting from ASI or Anterior Approach THR. The patient was advised that this is Dr. Irizarry's primary surgical approach for THR unless contraindicated by above. Specific ASI complications especially vascular injury, neurologic injury-lateral femoral cutaneous nerve, and proximal femur fracture were reviewed in detail.   The need for 4 weeks of Anterior THR dislocation precautions with activity and the risk of THR implant dislocation at any point Post-Op was reviewed in detail.   The patient was advised of the goals, alternatives, risks and benefits of a 3 week course of Celebrex 200 mg BID utilized by Dr. Irizarry in their practice to prevent Heterotopic Ossification seen in patients post total hip arthroplasty. If this is medically contraindicated the alternative would be a single dose (800cGy) radiation treatment to the hip implant site performed pre-operatively. A consultation with the Radiation Oncologist at [facility] will then be required.   The discharge plan of care focused on the patient going home following surgery. I encouraged the patient to make the necessary arrangements to have someone stay with them when they are discharged home. Following discharge, a home care nurse will visit the patient. They will open your home care case and request home physical therapy services. Home physical therapy   will commence following discharge provided it is appropriate and covered by their health insurance benefit plan.   All questions were answered to the satisfaction of the patient. The treatment plan of care, as well as a model of a total Hip equivalent to the one that will be used for their total joint replacement, was shared with the patient.   A DJO implant with Ceramic on Poly bearing surfaces is the implant I feel comfortable utilizing in my Primary THRs and the implant I am planning for their THR. If the patient wishes to utilize a different implant brand/type for their THR, they may obtain an additional surgical opinion from a Surgeon utilizing that brand implant.   The patient agreed to the plan of care as well as the use of implants in their total joint replacement.   The patient was advised that they will require a medical preoperative risk evaluation by their PCP. Further medical subspecialty clearances such as cardiac may be indicated if felt needed by their PCP.   The patient participated in an interactive discussion of the THR implant planned for their surgery with questions answered, agreed with the treatment plan, and has decided to move forward with elective THR as planned.

## 2024-10-01 ENCOUNTER — APPOINTMENT (OUTPATIENT)
Dept: INTERNAL MEDICINE | Facility: CLINIC | Age: 84
End: 2024-10-01

## 2024-10-01 NOTE — PATIENT PROFILE ADULT - FALL HARM RISK - FALL HARM RISK
[facility] ED  Emergency Department Encounter  Emergency Medicine Resident     Pt Name:Jaida Anderson  MRN: [id number]  Birthdate 1948  Date of evaluation: 10/1/24  PCP:  No primary care provider on file.  Note Started: 5:11 PM EDT      CHIEF COMPLAINT       Chief Complaint   Patient presents with    Extremity Weakness     Shopping and states her legs felt heavy, daughter worried patient might be having a stroke       HISTORY OF PRESENT ILLNESS  (Location/Symptom, Timing/Onset, Context/Setting, Quality, Duration, Modifying Factors, Severity.)      Jaida Anderson is a 76 y.o. female with significant pertinent medical history of previous intraventricular hemorrhage, as well as hypertension who presents with concerns from family member for altered mental status.  Patient has been weak as of late, and has had a couple falls within the last week or 2.  These falls are from weakness, and are not mechanical in nature.  Patient states that she had a recent hip replacement, on the left side, and states that when her family walks fast she is unable to keep up with them.  States that she is more weak on the left side than the right.  This is from a previous hip fracture that was repaired.    Patient does not have any lightheadedness or dizziness, does have generalized weakness, no chest pains, no shortness of breath, no chest pressure, no abdominal pains, no nausea or vomiting, however does have numbness and tingling in the arms and legs.    Patient has a longstanding history of alcohol use, and was counseled to limit her alcohol usage, however this has not been the case.  Patient continues to drink alcohol.    Given the patient has recent intraventricular hemorrhage, will perform CT scan of the head to evaluate for weakness.  Will obtain labs, thyroid function studies, as well as an ACS workup and an ammonia to evaluate for any significant altered mental status/weakness.      PAST MEDICAL / SURGICAL / SOCIAL / FAMILY  TECHNIQUE: CT of the head was performed without the administration of intravenous contrast. Automated exposure control, iterative reconstruction, and/or weight based adjustment of the mA/kV was utilized to reduce the radiation dose to as low as reasonably achievable. COMPARISON: CT head without contrast December 2-3, 2023. HISTORY: ORDERING SYSTEM PROVIDED HISTORY: pt with weakness, previous IVH, eval for any intracranial pathology TECHNOLOGIST PROVIDED HISTORY: pt with weakness, previous IVH, eval for any intracranial pathology Decision Support Exception - unselect if not a suspected or confirmed emergency medical condition->Emergency Medical Condition (MA) Reason for Exam: pt with weakness, previous IVH, eval for any intracranial pathology FINDINGS: BRAIN/VENTRICLES: There is no acute intracranial hemorrhage, mass effect or midline shift.  No abnormal extra-axial fluid collection.  No large vessel acute territory infarct.  Right parieto-occipital area of encephalomalacia secondary to prior intraparenchymal hemorrhage.  No ventriculomegaly. Confluent hypodensities within the periventricular subcortical white matter most likely chronic small vessel ischemic changes.  Nonspecific air is surrounding the cavernous sinus. 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 ORBITS: The visualized portion of the orbits demonstrate no acute abnormality. SINUSES: The visualized paranasal sinuses and mastoid air cells demonstrate no acute abnormality. SOFT TISSUES/SKULL:  No acute abnormality of the visualized skull or soft tissues.     1. No acute intracranial abnormality. 2. 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 3. Nonspecific air surrounding the cavernous sinus.  Favored to be secondary to intravenous injection and less likely penetrating trauma.     XR CHEST (2 VW)    Result Date: 10/1/2024  EXAMINATION: TWO  Surgery

## 2024-10-18 ENCOUNTER — APPOINTMENT (OUTPATIENT)
Dept: INTERNAL MEDICINE | Facility: CLINIC | Age: 84
End: 2024-10-18
Payer: MEDICARE

## 2024-10-18 ENCOUNTER — OUTPATIENT (OUTPATIENT)
Dept: OUTPATIENT SERVICES | Facility: HOSPITAL | Age: 84
LOS: 1 days | End: 2024-10-18
Payer: MEDICARE

## 2024-10-18 VITALS
DIASTOLIC BLOOD PRESSURE: 70 MMHG | HEIGHT: 64 IN | WEIGHT: 120 LBS | SYSTOLIC BLOOD PRESSURE: 130 MMHG | HEART RATE: 70 BPM | RESPIRATION RATE: 14 BRPM | BODY MASS INDEX: 20.49 KG/M2

## 2024-10-18 VITALS
SYSTOLIC BLOOD PRESSURE: 126 MMHG | HEART RATE: 82 BPM | WEIGHT: 115.74 LBS | RESPIRATION RATE: 18 BRPM | TEMPERATURE: 98 F | DIASTOLIC BLOOD PRESSURE: 76 MMHG | HEIGHT: 65 IN

## 2024-10-18 DIAGNOSIS — I10 ESSENTIAL (PRIMARY) HYPERTENSION: ICD-10-CM

## 2024-10-18 DIAGNOSIS — R73.09 OTHER ABNORMAL GLUCOSE: ICD-10-CM

## 2024-10-18 DIAGNOSIS — Z90.5 ACQUIRED ABSENCE OF KIDNEY: Chronic | ICD-10-CM

## 2024-10-18 DIAGNOSIS — Z90.721 ACQUIRED ABSENCE OF OVARIES, UNILATERAL: Chronic | ICD-10-CM

## 2024-10-18 DIAGNOSIS — Z01.818 ENCOUNTER FOR OTHER PREPROCEDURAL EXAMINATION: ICD-10-CM

## 2024-10-18 DIAGNOSIS — Z98.890 OTHER SPECIFIED POSTPROCEDURAL STATES: Chronic | ICD-10-CM

## 2024-10-18 DIAGNOSIS — M16.12 UNILATERAL PRIMARY OSTEOARTHRITIS, LEFT HIP: ICD-10-CM

## 2024-10-18 DIAGNOSIS — Z92.89 PERSONAL HISTORY OF OTHER MEDICAL TREATMENT: Chronic | ICD-10-CM

## 2024-10-18 DIAGNOSIS — E78.5 HYPERLIPIDEMIA, UNSPECIFIED: ICD-10-CM

## 2024-10-18 LAB
A1C WITH ESTIMATED AVERAGE GLUCOSE RESULT: 5.5 % — SIGNIFICANT CHANGE UP (ref 4–5.6)
ALBUMIN SERPL ELPH-MCNC: 3.1 G/DL — LOW (ref 3.3–5)
ALP SERPL-CCNC: 126 U/L — HIGH (ref 30–120)
ALT FLD-CCNC: 29 U/L — SIGNIFICANT CHANGE UP (ref 10–60)
ANION GAP SERPL CALC-SCNC: 8 MMOL/L — SIGNIFICANT CHANGE UP (ref 5–17)
APTT BLD: 29.6 SEC — SIGNIFICANT CHANGE UP (ref 24.5–35.6)
AST SERPL-CCNC: 17 U/L — SIGNIFICANT CHANGE UP (ref 10–40)
BILIRUB SERPL-MCNC: 0.5 MG/DL — SIGNIFICANT CHANGE UP (ref 0.2–1.2)
BLD GP AB SCN SERPL QL: SIGNIFICANT CHANGE UP
BUN SERPL-MCNC: 22 MG/DL — SIGNIFICANT CHANGE UP (ref 7–23)
CALCIUM SERPL-MCNC: 9.1 MG/DL — SIGNIFICANT CHANGE UP (ref 8.4–10.5)
CHLORIDE SERPL-SCNC: 105 MMOL/L — SIGNIFICANT CHANGE UP (ref 96–108)
CO2 SERPL-SCNC: 25 MMOL/L — SIGNIFICANT CHANGE UP (ref 22–31)
CREAT SERPL-MCNC: 1.09 MG/DL — SIGNIFICANT CHANGE UP (ref 0.5–1.3)
EGFR: 50 ML/MIN/1.73M2 — LOW
ESTIMATED AVERAGE GLUCOSE: 111 MG/DL — SIGNIFICANT CHANGE UP (ref 68–114)
GLUCOSE SERPL-MCNC: 158 MG/DL — HIGH (ref 70–99)
HCT VFR BLD CALC: 39.9 % — SIGNIFICANT CHANGE UP (ref 34.5–45)
HGB BLD-MCNC: 12.8 G/DL — SIGNIFICANT CHANGE UP (ref 11.5–15.5)
INR BLD: 0.99 RATIO — SIGNIFICANT CHANGE UP (ref 0.85–1.16)
MCHC RBC-ENTMCNC: 29 PG — SIGNIFICANT CHANGE UP (ref 27–34)
MCHC RBC-ENTMCNC: 32.1 G/DL — SIGNIFICANT CHANGE UP (ref 32–36)
MCV RBC AUTO: 90.3 FL — SIGNIFICANT CHANGE UP (ref 80–100)
MRSA PCR RESULT.: SIGNIFICANT CHANGE UP
NRBC # BLD: 0 /100 WBCS — SIGNIFICANT CHANGE UP (ref 0–0)
PLATELET # BLD AUTO: 205 K/UL — SIGNIFICANT CHANGE UP (ref 150–400)
POTASSIUM SERPL-MCNC: 3.5 MMOL/L — SIGNIFICANT CHANGE UP (ref 3.5–5.3)
POTASSIUM SERPL-SCNC: 3.5 MMOL/L — SIGNIFICANT CHANGE UP (ref 3.5–5.3)
PROT SERPL-MCNC: 7.1 G/DL — SIGNIFICANT CHANGE UP (ref 6–8.3)
PROTHROM AB SERPL-ACNC: 11.5 SEC — SIGNIFICANT CHANGE UP (ref 9.9–13.4)
RBC # BLD: 4.42 M/UL — SIGNIFICANT CHANGE UP (ref 3.8–5.2)
RBC # FLD: 13.3 % — SIGNIFICANT CHANGE UP (ref 10.3–14.5)
S AUREUS DNA NOSE QL NAA+PROBE: SIGNIFICANT CHANGE UP
SODIUM SERPL-SCNC: 138 MMOL/L — SIGNIFICANT CHANGE UP (ref 135–145)
WBC # BLD: 7.13 K/UL — SIGNIFICANT CHANGE UP (ref 3.8–10.5)
WBC # FLD AUTO: 7.13 K/UL — SIGNIFICANT CHANGE UP (ref 3.8–10.5)

## 2024-10-18 PROCEDURE — 36415 COLL VENOUS BLD VENIPUNCTURE: CPT

## 2024-10-18 PROCEDURE — 99214 OFFICE O/P EST MOD 30 MIN: CPT

## 2024-10-18 PROCEDURE — G2211 COMPLEX E/M VISIT ADD ON: CPT

## 2024-10-18 PROCEDURE — 93010 ELECTROCARDIOGRAM REPORT: CPT

## 2024-10-18 NOTE — H&P PST ADULT - NSICDXPASTMEDICALHX_GEN_ALL_CORE_FT
PAST MEDICAL HISTORY:  Anxiety     Breast cancer     Carcinoma, renal cell     GERD (gastroesophageal reflux disease)     H/O ulcerative colitis     History of Torres esophagus     History of squamous cell carcinoma excision     HLD (hyperlipidemia)     HTN (hypertension)     Insomnia     Left hip pain     Lung nodule     Osteoarthritis of left hip     Skin cancer     Uterine prolapse

## 2024-10-18 NOTE — H&P PST ADULT - HISTORY OF PRESENT ILLNESS
84 y/o female with PMH HTN, ulcerative colitis, carcinomleft nephrectomy, presents for left hip pain which started about 2 to 3 months ago without any injury. She states that  the pain is progressively getting  worsened. she rates the pain as an 8-9 out of 10 which is constant with any sort of movement. Pain starts in the groin and can radiate down to the knee. She has significant difficulty with putting her socks and shoes on. She is no longer able to walk long distances . Occasionally she will take Tylenol with minimal improvement in symptoms. Patient is scheduled for Left total hip replacement on 11/4/2024 84 y/o female with PMH HTN, ulcerative colitis, carcinoma, left nephrectomy, osteoarthritis, presents for left hip pain which started about 2 to 3 months ago without any injury. She states that  the pain is progressively getting  worsened. she rates the pain as an 8-9 out of 10 which is constant with any sort of movement. Pain starts in the groin and can radiate down to the knee. She has significant difficulty with putting her socks and shoes on. She is no longer able to walk long distances . Occasionally she will take Tylenol with minimal improvement in symptoms. Patient is scheduled for Left total hip replacement on 11/4/2024 82 y/o female with PMH HTN, ulcerative colitis, carcinoma, left nephrectomy, osteoarthritis, presents for left hip pain which started about 2 to 3 months ago without any injury. She states that  the pain is progressively getting  worsened. she rates the pain as an 8-9 out of 10 which is constant with any sort of movement. She has significant difficulty with putting her socks and shoes on. She is no longer able to walk long distances . Occasionally she will take Tylenol with minimal improvement in symptoms. Patient is scheduled for Left total hip replacement on 11/4/2024

## 2024-10-18 NOTE — H&P PST ADULT - NEGATIVE ENMT SYMPTOMS
denies dentures/no ear pain/no tinnitus/no vertigo/no sinus symptoms denies dentures, loose broken teeth/no ear pain/no tinnitus/no vertigo/no sinus symptoms

## 2024-10-18 NOTE — H&P PST ADULT - MUSCULOSKELETAL
[facility]  Hematology/Oncology Clinic  Follow-up Visit Note    CC:  Thyroid cancer    HISTORY OF PRESENT ILLNESS:  Elias Salinas is a 89 year old male with a diagnosis of follicular thyroid cancer, presenting today in follow-up.    Patient is a pleasant 89 year old man presenting today for follow-up oncologic evaluation.  He was diagnosed originally in June 2018, at which time total thyroidectomy was performed, along with resection of left ribs 2, 3, 4 in context of metastatic disease.  Surgical pathology demonstrated follicular carcinoma (5.3 cm) , widely invasive , involving the right lobe, positive for extensive angiolymphatic invasion, with margins otherwise negative for carcinoma, and ribs 2-4 demonstrating metastatic follicular carcinoma involving the 3rd rib in surrounding soft tissue, completely excised.  Angiolymphatic invasion was additionally identified on this specimen.  He subsequently received radioactive iodine therapy with 153 of mCi of iodine-131 as of September 2018.    He has since completed follow-up imaging studies, results which demonstrate evidence of several lymph nodes, both in the right lateral neck, and thyroid bed, biopsies from which were negative for evidence of malignancy.  He ultimately has completed PET scan as of June 2021, demonstrating evidence of a T3 vertebral body oligometastasis lesion and has since completed SBRT under the care of Dr. Farris as of July 2021.    Patient has since experienced slow rise in his thyroglobulin, 9.2 as of April 2021, rising to 16.8 as of September 2021, 19.7 as of 4/27/22, and 27.29 as of 8/30/22.  His most recent thyroglobulin has continued to rise, currently greater than 800.  He has completed restaging imaging including PET/CT and more recently MRI of the C and T-spine, demonstrating evidence of progression of disease, with demonstration of C7 vertebral body metastasis measuring 1.8 cm in maximum dimension, with epidural  tumor extension into the left lateral recess resulting in moderate left lateral recess narrowing without mass effect on of the cord.  Additionally, numerous other metastatic lesions were noted throughout the C and T-spine.    Presently, patient reports he is otherwise feeling very well.  He continues to exercise regularly, he denies any pain.  He has questions regarding anticipated further clinical course, and recommendations for follow-up.      Patient Active Problem List    Diagnosis Date Noted    Stage 3b chronic kidney disease  (CMD) 10/25/2013     Priority: Medium    Chronic kidney disease, stage 3a  (CMD) 04/10/2024     Priority: Low    Monoallelic mutation of POT1 gene 08/16/2022     Priority: Low     c.1087C>T (p.[id number]*); recommend annual dermatologic exams and CBC      Malignant neoplasm of thyroid gland  (CMD) 06/13/2022     Priority: Low    Malignant neoplasm metastatic to bone  (CMD) 06/13/2022     Priority: Low    Encounter for monitoring of hydroxychloroquine therapy 10/27/2020     Priority: Low    Hyperopia of right eye with astigmatism and presbyopia 10/27/2020     Priority: Low    Primary cancer of thyroid gland metastatic to bone  (CMD) 10/07/2020     Priority: Low    Thyroid cancer  (CMD) 02/19/2020     Priority: Low    Postoperative hypothyroidism 06/27/2018     Priority: Low    Follicular thyroid carcinoma  (CMD) 04/20/2018     Priority: Low     Stage IV based on follicular thyroid cancer with extensive vascular invasion> 4 or more vessels and high risk of recurrence. Total thyroidectomy and rib resection June 6, 2018.      Generalized osteoarthrosis, involving multiple sites 03/16/2018     Priority: Low    Myopia of left eye with astigmatism and presbyopia 10/10/2017     Priority: Low    Epiretinal membrane (ERM) of left eye 10/10/2017     Priority: Low    Nevus of choroid of left eye 10/10/2017     Priority: Low    Right groin pain 02/01/2017     Priority: Low    Seronegative rheumatoid  arthritis  (CMD) 09/23/2016     Priority: Low    Long-term use of immunosuppressant medication 09/23/2016     Priority: Low    Pseudophakia of both eyes 07/18/2016     Priority: Low    After cataract of both eyes not obscuring vision 07/18/2016     Priority: Low    Dermatochalasis of eyelids of both eyes 07/18/2016     Priority: Low    Benign prostatic hyperplasia without lower urinary tract symptoms 11/20/2015     Priority: Low    Vitamin D deficiency 10/06/2015     Priority: Low    PMR (polymyalgia rheumatica)  (CMD) 08/26/2015     Priority: Low    PVD (posterior vitreous detachment), both eyes 07/10/2015     Priority: Low    Hyperlipidemia 05/01/2014     Priority: Low    Essential hypertension      Priority: Low    ED      Priority: Low       Past Medical History:   Diagnosis Date    Actinic keratosis     Choroidal nevus 7/10/2015    Chronic pain     r/t RA    ED (erectile dysfunction)     Fracture     hand fracture (healed non-surgical)    Frequency of urination 9/9/2015    HTN (hypertension)     Malignant neoplasm  (CMD) 04/2018    metastatic follicular thyroid (spread to L ribs), stage IV    Monoallelic mutation of POT1 gene 8/16/2022    c.1087C>T (p.Knd032*); recommend annual dermatologic exams and CBC    Other after-cataract, not obscuring vision 7/10/2015    Seronegative rheumatoid arthritis  (CMD) 9/23/2016    Stomach ulcer \"long time ago\"    Thyroid cancer  (CMD)     Wears hearing aid in both ears 2018    Wears partial dentures 2018    UPPERS    Wears prescription eyeglasses 2018     Past Surgical History:   Procedure Laterality Date    Chest wall resection  06/06/2018    removal of ribs 2,3,4 on left    Colonoscopy N/A 12/07/2006    [facility]    Eye surgery Bilateral 1990    cataract surgery    Inguinal hernia repair Right 05/25/2023    [facility]    Skin biopsy      dermatoligist removed lesions from legs/head (benign)    Thoracentesis Left 04/06/2018    Thyroidectomy  06/06/2018    Tonsillectomy       adolescence     Family History   Problem Relation Age of Onset    Patient is unaware of any medical problems Mother     Patient is unaware of any medical problems Father     Cancer, Esophageal Brother     Cancer, Colon Paternal Grandfather         60s    Early death Son         18 months    Muscular dystrophy Son     Diabetes Son     Muscular dystrophy Son     Early death Son         still born     ALLERGIES:   Allergen Reactions    Gabapentin DIZZINESS     Intolerance- made pt very \"goofy\" and dizzy, fell twice while taking        Social History     Social History Narrative    Not on file       Current Outpatient Medications   Medication Sig Dispense Refill    Ferrous Sulfate (Iron) 325 (65 Fe) MG Tab Take 1 tablet by mouth every other day. 90 tablet 1    metoPROLOL succinate (TOPROL-XL) 100 MG 24 hr tablet TAKE 1 TABLET EVERY DAY 90 tablet 1    irbesartan (AVAPRO) 300 MG tablet TAKE 1 TABLET EVERY DAY 90 tablet 1    amLODIPine (NORVASC) 5 MG tablet Take 1 tablet by mouth every morning. 90 tablet 3    furosemide (LASIX) 20 MG tablet Take 1 tablet by mouth daily. 90 tablet 3    levothyroxine 125 MCG tablet Take 1 tablet by mouth 6 days a week Monday through Saturday AND 0.5 tablets 1 day a week on Sunday 78 tablet 3    cholecalciferol (VITAMIN D) 25 mcg (1,000 units) tablet Take 2,000 Units by mouth daily.      calcium carbonate (TUMS) 500 MG chewable tablet Chew 1 tablet by mouth 2 times daily. 60 tablet 0     No current facility-administered medications for this visit.       REVIEW OF SYSTEMS:  Reviewed from nurse’s notes and concur.      PHYSICAL EXAMINATION:   Oncology Encounter Vitals [09/12/24 1139]   ONC OP Encounter Vitals Group      /58      Heart Rate 61      Resp 14      Temp 98.1 °F (36.7 °C)      Temp src       SpO2 99 %      Weight 151 lb 7.3 oz (68.7 kg)      Height       Pain Score  0      Pain Location       Pain Education?       BSA (Calculated - m2) - [employer]       BSA (Calculated -  sq m)       BMI (Calculated)    ECOG 1  General Appearance - Alert, well appearing, and in no distress.  Mental Status - Alert, oriented to person, place, and time.   Eyes - Pupils equal and reactive, extraocular eye movements intact.   Lymphatics - no significant lymphadenopathy in cervical, submandibular, supraclavicular chains bilaterally  Remainder of exam deferred as this is primarily a counseling visit    Labs reviewed and discussed with patient:  WBC (K/mcL)   Date Value   09/12/2024 4.9     RBC (mil/mcL)   Date Value   09/12/2024 3.76 (L)     HCT (%)   Date Value   09/12/2024 34.8 (L)     HGB (g/dL)   Date Value   09/12/2024 11.6 (L)     PLT (K/mcL)   Date Value   09/12/2024 152     Sodium (mmol/L)   Date Value   09/12/2024 141     Potassium (mmol/L)   Date Value   09/12/2024 4.6     Chloride (mmol/L)   Date Value   09/12/2024 106     Glucose (mg/dL)   Date Value   09/12/2024 131 (H)     Calcium (mg/dL)   Date Value   09/12/2024 9.1     Carbon Dioxide (mmol/L)   Date Value   09/12/2024 27     BUN (mg/dL)   Date Value   09/12/2024 54 (H)     Creatinine (mg/dL)   Date Value   09/12/2024 1.73 (H)       GOT/AST (Units/L)   Date Value   09/12/2024 20     GPT/ALT (Units/L)   Date Value   09/12/2024 27     No results found for: \"GGTP\"  Alkaline Phosphatase (Units/L)   Date Value   09/12/2024 74     Bilirubin, Total (mg/dL)   Date Value   09/12/2024 0.4     Imaging results reviewed and discussed with patient, as discussed above as per HPI    ASSESSMENT AND PLAN:  In summary, Elias Salinas is a 89 year old male with a history of follicular thyroid carcinoma, with relapse following thyroidectomy and neck dissection with metastatectomy (ribs 2-4) in 2018, followed by radioactive iodine therapy, most recently SBRT to the T3 vertebral body lesion, presenting today for follow-up oncologic evaluation.      I discussed with patient findings from MRI scan, demonstrating evidence of C7 vertebral body lesion consistent with  details… metastatic disease.  This is now resulting in encroachment in the epidural space, with extension to the left lateral recess, and I discussed with him that ultimately he may experience neurologic compromise related to cord compression.  We discussed merits of considering therapy, including lenvatinib or radiation.  I discussed with him that both treatments would be given with palliative intent.  We then had a long goals of care discussion surrounding these modalities, acknowledging patient's age, comorbidities, and consideration that these interventions would be noncurative.  He does understand that he has the option to proceed with these interventions to prevent neurologic compromise and address discomfort; however, he is asymptomatic at the present juncture, and after extensive discussion, he elects not to proceed with these interventions for now, and instead elects to proceed with observation.    We did discuss consideration for bone targeted therapy, and I acknowledged that generally this can prevent/mitigate risk of disease-associated bone fracture.  At the present juncture, he declines to proceed with this.    I will therefore plan to meet with patient next in January 2025 after completion of updated MRIs.  He understands to contact us in the event of any pain, and I additionally counseled him regarding neurologic symptoms potentially in association with cord compression.  All questions answered to his satisfaction, and in total at least 45 minutes were spent in combined care today.      Carlin Osuna MD    left hip/decreased ROM/decreased ROM due to pain left hip/decreased ROM/decreased ROM due to pain/abnormal gait

## 2024-10-18 NOTE — H&P PST ADULT - PROBLEM SELECTOR PLAN 1
84 y/o female with left hip  pain  scheduled surgery: osteoarthritis left hip  will obtain medical, oncology  clearance  Patient provided with pre-operative instructions and verbalized understanding.    Patient will be NPO on day of surgery.   Patient will stop NSAIDs, aspirin, herbal supplements or vitamins 1 week prior to surgery.    Chlorhexidine wash and Gatorade provided with instructions.

## 2024-10-18 NOTE — H&P PST ADULT - NSICDXFAMILYHX_GEN_ALL_CORE_FT
FAMILY HISTORY:  Father  Still living? Unknown  FH: heart disease, Age at diagnosis: Age Unknown  FHx: diabetic complications, Age at diagnosis: Age Unknown    Mother  Still living? No  FH: heart disease, Age at diagnosis: Age Unknown  FHx: diabetic complications, Age at diagnosis: Age Unknown

## 2024-10-18 NOTE — H&P PST ADULT - NSICDXPASTSURGICALHX_GEN_ALL_CORE_FT
PAST SURGICAL HISTORY:  Acquired absence of left kidney     H/O left nephrectomy     History of immunotherapy     History of repair of right hip joint     S/P appendectomy     S/P lumpectomy, left breast     S/P removal of left ovary

## 2024-10-19 LAB
ESTIMATED AVERAGE GLUCOSE: 108 MG/DL
HBA1C MFR BLD HPLC: 5.4 %

## 2024-10-31 PROBLEM — C64.9 MALIGNANT NEOPLASM OF UNSPECIFIED KIDNEY, EXCEPT RENAL PELVIS: Chronic | Status: ACTIVE | Noted: 2024-10-18

## 2024-10-31 PROBLEM — G47.00 INSOMNIA, UNSPECIFIED: Chronic | Status: ACTIVE | Noted: 2024-10-18

## 2024-10-31 PROBLEM — C50.919 MALIGNANT NEOPLASM OF UNSPECIFIED SITE OF UNSPECIFIED FEMALE BREAST: Chronic | Status: ACTIVE | Noted: 2024-10-18

## 2024-10-31 PROBLEM — N81.4 UTEROVAGINAL PROLAPSE, UNSPECIFIED: Chronic | Status: ACTIVE | Noted: 2024-10-18

## 2024-10-31 PROBLEM — Z87.19 PERSONAL HISTORY OF OTHER DISEASES OF THE DIGESTIVE SYSTEM: Chronic | Status: ACTIVE | Noted: 2024-10-18

## 2024-10-31 PROBLEM — M25.552 PAIN IN LEFT HIP: Chronic | Status: ACTIVE | Noted: 2024-10-18

## 2024-10-31 PROBLEM — Z98.890 OTHER SPECIFIED POSTPROCEDURAL STATES: Chronic | Status: ACTIVE | Noted: 2024-10-18

## 2024-10-31 PROBLEM — R91.1 SOLITARY PULMONARY NODULE: Chronic | Status: ACTIVE | Noted: 2024-10-18

## 2024-10-31 PROBLEM — K21.9 GASTRO-ESOPHAGEAL REFLUX DISEASE WITHOUT ESOPHAGITIS: Chronic | Status: ACTIVE | Noted: 2024-10-18

## 2024-10-31 PROBLEM — M16.12 UNILATERAL PRIMARY OSTEOARTHRITIS, LEFT HIP: Chronic | Status: ACTIVE | Noted: 2024-10-18

## 2024-11-04 ENCOUNTER — TRANSCRIPTION ENCOUNTER (OUTPATIENT)
Age: 84
End: 2024-11-04

## 2024-11-04 ENCOUNTER — APPOINTMENT (OUTPATIENT)
Dept: ORTHOPEDIC SURGERY | Facility: HOSPITAL | Age: 84
End: 2024-11-04

## 2024-11-04 ENCOUNTER — INPATIENT (INPATIENT)
Facility: HOSPITAL | Age: 84
LOS: 0 days | Discharge: HOME | DRG: 470 | End: 2024-11-05
Attending: ORTHOPAEDIC SURGERY | Admitting: ORTHOPAEDIC SURGERY
Payer: COMMERCIAL

## 2024-11-04 VITALS
OXYGEN SATURATION: 98 % | HEART RATE: 81 BPM | TEMPERATURE: 97 F | WEIGHT: 116.62 LBS | RESPIRATION RATE: 18 BRPM | SYSTOLIC BLOOD PRESSURE: 139 MMHG | HEIGHT: 65 IN | DIASTOLIC BLOOD PRESSURE: 90 MMHG

## 2024-11-04 DIAGNOSIS — Z98.890 OTHER SPECIFIED POSTPROCEDURAL STATES: Chronic | ICD-10-CM

## 2024-11-04 DIAGNOSIS — Z90.5 ACQUIRED ABSENCE OF KIDNEY: Chronic | ICD-10-CM

## 2024-11-04 DIAGNOSIS — Z90.721 ACQUIRED ABSENCE OF OVARIES, UNILATERAL: Chronic | ICD-10-CM

## 2024-11-04 DIAGNOSIS — M16.12 UNILATERAL PRIMARY OSTEOARTHRITIS, LEFT HIP: ICD-10-CM

## 2024-11-04 DIAGNOSIS — Z92.89 PERSONAL HISTORY OF OTHER MEDICAL TREATMENT: Chronic | ICD-10-CM

## 2024-11-04 PROCEDURE — 27130 TOTAL HIP ARTHROPLASTY: CPT | Mod: AS,LT

## 2024-11-04 PROCEDURE — 99222 1ST HOSP IP/OBS MODERATE 55: CPT

## 2024-11-04 PROCEDURE — 27130 TOTAL HIP ARTHROPLASTY: CPT | Mod: LT

## 2024-11-04 DEVICE — SLEEVE BIOLOX DELTA OPTION NEUTRAL: Type: IMPLANTABLE DEVICE | Status: FUNCTIONAL

## 2024-11-04 DEVICE — LINER ACET EMPOWR METAL DUAL MOBILITY 40 ALPHA E: Type: IMPLANTABLE DEVICE | Status: FUNCTIONAL

## 2024-11-04 DEVICE — FEM HD BIOLOX DELTA 28MM: Type: IMPLANTABLE DEVICE | Status: FUNCTIONAL

## 2024-11-04 DEVICE — SCREW ACET SZ 6.5X30MM: Type: IMPLANTABLE DEVICE | Status: FUNCTIONAL

## 2024-11-04 DEVICE — CUP ACET EMPOWR CLUSTER 50MM ALPHA E: Type: IMPLANTABLE DEVICE | Status: FUNCTIONAL

## 2024-11-04 DEVICE — STEM BLADE EMPOWER SZ 9 LAT 132 DEG: Type: IMPLANTABLE DEVICE | Status: FUNCTIONAL

## 2024-11-04 DEVICE — CABLE CABLE-RDY PLT TROCH 1.8X635: Type: IMPLANTABLE DEVICE | Status: FUNCTIONAL

## 2024-11-04 DEVICE — BONE WAX 2.5GM: Type: IMPLANTABLE DEVICE | Status: FUNCTIONAL

## 2024-11-04 DEVICE — BEARING EMPOWR DUAL MOBILITY 40 ALPHA E: Type: IMPLANTABLE DEVICE | Status: FUNCTIONAL

## 2024-11-04 RX ORDER — HYDROMORPHONE HCL/0.9% NACL/PF 6 MG/30 ML
0.5 PATIENT CONTROLLED ANALGESIA SYRINGE INTRAVENOUS
Refills: 0 | Status: DISCONTINUED | OUTPATIENT
Start: 2024-11-04 | End: 2024-11-04

## 2024-11-04 RX ORDER — HYDROMORPHONE HCL/0.9% NACL/PF 6 MG/30 ML
0.2 PATIENT CONTROLLED ANALGESIA SYRINGE INTRAVENOUS
Refills: 0 | Status: DISCONTINUED | OUTPATIENT
Start: 2024-11-04 | End: 2024-11-04

## 2024-11-04 RX ORDER — PANTOPRAZOLE SODIUM 40 MG/1
40 TABLET, DELAYED RELEASE ORAL
Refills: 0 | Status: DISCONTINUED | OUTPATIENT
Start: 2024-11-04 | End: 2024-11-05

## 2024-11-04 RX ORDER — SODIUM CHLORIDE 9 MG/ML
500 INJECTION, SOLUTION INTRAMUSCULAR; INTRAVENOUS; SUBCUTANEOUS ONCE
Refills: 0 | Status: COMPLETED | OUTPATIENT
Start: 2024-11-04 | End: 2024-11-04

## 2024-11-04 RX ORDER — DIAZEPAM 10 MG/1
1 TABLET ORAL
Refills: 0 | DISCHARGE

## 2024-11-04 RX ORDER — ACETAMINOPHEN 500 MG
1000 TABLET ORAL ONCE
Refills: 0 | Status: COMPLETED | OUTPATIENT
Start: 2024-11-05 | End: 2024-11-05

## 2024-11-04 RX ORDER — SENNA 187 MG
2 TABLET ORAL AT BEDTIME
Refills: 0 | Status: DISCONTINUED | OUTPATIENT
Start: 2024-11-04 | End: 2024-11-05

## 2024-11-04 RX ORDER — NALOXONE HYDROCHLORIDE 1 MG/ML
4 INJECTION, SOLUTION INTRAMUSCULAR; INTRAVENOUS; SUBCUTANEOUS
Qty: 1 | Refills: 0
Start: 2024-11-04

## 2024-11-04 RX ORDER — APREPITANT 40 MG/1
40 CAPSULE ORAL ONCE
Refills: 0 | Status: COMPLETED | OUTPATIENT
Start: 2024-11-04 | End: 2024-11-04

## 2024-11-04 RX ORDER — ACETAMINOPHEN 500 MG
1000 TABLET ORAL ONCE
Refills: 0 | Status: COMPLETED | OUTPATIENT
Start: 2024-11-04 | End: 2024-11-04

## 2024-11-04 RX ORDER — APIXABAN 5 MG/1
2.5 TABLET, FILM COATED ORAL EVERY 12 HOURS
Refills: 0 | Status: DISCONTINUED | OUTPATIENT
Start: 2024-11-05 | End: 2024-11-05

## 2024-11-04 RX ORDER — AMLODIPINE BESYLATE 10 MG
5 TABLET ORAL DAILY
Refills: 0 | Status: DISCONTINUED | OUTPATIENT
Start: 2024-11-06 | End: 2024-11-05

## 2024-11-04 RX ORDER — TRANEXAMIC ACID 650 MG/1
1000 TABLET ORAL ONCE
Refills: 0 | Status: COMPLETED | OUTPATIENT
Start: 2024-11-04 | End: 2024-11-04

## 2024-11-04 RX ORDER — DIAZEPAM 10 MG/1
5 FILM BUCCAL EVERY 12 HOURS
Refills: 0 | Status: DISCONTINUED | OUTPATIENT
Start: 2024-11-04 | End: 2024-11-05

## 2024-11-04 RX ORDER — MESALAMINE 1.2 G/1
2400 TABLET, DELAYED RELEASE ORAL
Refills: 0 | Status: DISCONTINUED | OUTPATIENT
Start: 2024-11-04 | End: 2024-11-04

## 2024-11-04 RX ORDER — CEFAZOLIN SODIUM 1 G
2000 VIAL (EA) INJECTION EVERY 8 HOURS
Refills: 0 | Status: COMPLETED | OUTPATIENT
Start: 2024-11-04 | End: 2024-11-05

## 2024-11-04 RX ORDER — MESALAMINE 1.2 G/1
4.8 TABLET, DELAYED RELEASE ORAL
Refills: 0 | Status: COMPLETED | OUTPATIENT
Start: 2024-11-04 | End: 2024-11-04

## 2024-11-04 RX ORDER — POLYETHYLENE GLYCOL 3350 17 G/17G
17 POWDER, FOR SOLUTION ORAL AT BEDTIME
Refills: 0 | Status: DISCONTINUED | OUTPATIENT
Start: 2024-11-04 | End: 2024-11-05

## 2024-11-04 RX ORDER — MESALAMINE 1.2 G/1
2.4 TABLET, DELAYED RELEASE ORAL
Refills: 0 | Status: DISCONTINUED | OUTPATIENT
Start: 2024-11-05 | End: 2024-11-05

## 2024-11-04 RX ORDER — HYDROMORPHONE HCL/0.9% NACL/PF 6 MG/30 ML
0.5 PATIENT CONTROLLED ANALGESIA SYRINGE INTRAVENOUS
Refills: 0 | Status: DISCONTINUED | OUTPATIENT
Start: 2024-11-04 | End: 2024-11-05

## 2024-11-04 RX ORDER — OXYCODONE HYDROCHLORIDE 30 MG/1
10 TABLET ORAL
Refills: 0 | Status: DISCONTINUED | OUTPATIENT
Start: 2024-11-04 | End: 2024-11-05

## 2024-11-04 RX ORDER — ACETAMINOPHEN 500 MG
1000 TABLET ORAL EVERY 8 HOURS
Refills: 0 | Status: DISCONTINUED | OUTPATIENT
Start: 2024-11-05 | End: 2024-11-05

## 2024-11-04 RX ORDER — APIXABAN 5 MG/1
1 TABLET, FILM COATED ORAL
Qty: 55 | Refills: 0
Start: 2024-11-04

## 2024-11-04 RX ORDER — CEFAZOLIN SODIUM 1 G
2000 VIAL (EA) INJECTION ONCE
Refills: 0 | Status: COMPLETED | OUTPATIENT
Start: 2024-11-04 | End: 2024-11-04

## 2024-11-04 RX ORDER — OXYCODONE HYDROCHLORIDE 30 MG/1
5 TABLET ORAL
Refills: 0 | Status: DISCONTINUED | OUTPATIENT
Start: 2024-11-04 | End: 2024-11-05

## 2024-11-04 RX ORDER — MAGNESIUM HYDROXIDE 1200 MG/15ML
30 SUSPENSION ORAL DAILY
Refills: 0 | Status: DISCONTINUED | OUTPATIENT
Start: 2024-11-04 | End: 2024-11-05

## 2024-11-04 RX ORDER — ONDANSETRON HYDROCHLORIDE 2 MG/ML
4 INJECTION, SOLUTION INTRAMUSCULAR; INTRAVENOUS EVERY 6 HOURS
Refills: 0 | Status: DISCONTINUED | OUTPATIENT
Start: 2024-11-04 | End: 2024-11-05

## 2024-11-04 RX ORDER — MESALAMINE 1.2 G/1
2 TABLET, DELAYED RELEASE ORAL
Refills: 0 | DISCHARGE

## 2024-11-04 RX ORDER — CHLORHEXIDINE GLUCONATE 40 MG/ML
1 SOLUTION TOPICAL ONCE
Refills: 0 | Status: COMPLETED | OUTPATIENT
Start: 2024-11-04 | End: 2024-11-04

## 2024-11-04 RX ORDER — ONDANSETRON HYDROCHLORIDE 2 MG/ML
4 INJECTION, SOLUTION INTRAMUSCULAR; INTRAVENOUS ONCE
Refills: 0 | Status: DISCONTINUED | OUTPATIENT
Start: 2024-11-04 | End: 2024-11-04

## 2024-11-04 RX ORDER — DEXAMETHASONE 1.5 MG 1.5 MG/1
8 TABLET ORAL ONCE
Refills: 0 | Status: COMPLETED | OUTPATIENT
Start: 2024-11-05 | End: 2024-11-05

## 2024-11-04 RX ADMIN — APREPITANT 40 MILLIGRAM(S): 40 CAPSULE ORAL at 09:32

## 2024-11-04 RX ADMIN — Medication 100 MILLIGRAM(S): at 19:32

## 2024-11-04 RX ADMIN — SODIUM CHLORIDE 500 MILLILITER(S): 9 INJECTION, SOLUTION INTRAMUSCULAR; INTRAVENOUS; SUBCUTANEOUS at 15:35

## 2024-11-04 RX ADMIN — MESALAMINE 4.8 GRAM(S): 1.2 TABLET, DELAYED RELEASE ORAL at 18:16

## 2024-11-04 RX ADMIN — Medication 2 TABLET(S): at 21:31

## 2024-11-04 RX ADMIN — CHLORHEXIDINE GLUCONATE 1 APPLICATION(S): 40 SOLUTION TOPICAL at 09:32

## 2024-11-04 RX ADMIN — Medication 100 MILLILITER(S): at 19:32

## 2024-11-04 RX ADMIN — Medication 400 MILLIGRAM(S): at 18:49

## 2024-11-04 RX ADMIN — Medication 1000 MILLIGRAM(S): at 19:09

## 2024-11-04 RX ADMIN — SODIUM CHLORIDE 500 MILLILITER(S): 9 INJECTION, SOLUTION INTRAMUSCULAR; INTRAVENOUS; SUBCUTANEOUS at 21:31

## 2024-11-04 RX ADMIN — Medication 10 MILLIGRAM(S): at 21:31

## 2024-11-04 NOTE — DISCHARGE NOTE PROVIDER - NSDCCPCAREPLAN_GEN_ALL_CORE_FT
PRINCIPAL DISCHARGE DIAGNOSIS  Diagnosis: Primary localized osteoarthritis of left hip  Assessment and Plan of Treatment:      PRINCIPAL DISCHARGE DIAGNOSIS  Diagnosis: Primary localized osteoarthritis of left hip  Assessment and Plan of Treatment: Anterior approach  Your new total hip replacement requires proper care.  Your surgical care provider is your best resource for information.  Physical Therapy/Occupational Therapy for: Ambulation, transfers, stairs, & ADLs, isometrics.  Full weight bearing on both legs; Walker/cane use as instructed by Physical therapy/Occupational therapy.  Anterior Total Hip Replacement precautions for  6 weeks:  - No straight leg raise;  - No external rotation of hip when extended-standing or lying flat; No hyperextension of hip when standing (kickback).  - Use pain medication if needed as prescribed.  Ice packs are a helpful addition to your comfort.  Applying a  waterproof ice pack over a cloth or thin towel to the site for 30 minutes per hour may provide benefit by reducing swelling and discomfort.  [place] dressing is over your hip wound.  It is waterproof and you may shower.  Do not aim shower stream at surgical site and pat dry with clean towel after showering.   Remove [place] dressing 7 days after surgery and leave wound open to air.  - Keep dressing clean.   -Take short, frequent walks increasing the distance that you walk each day as tolerated.  - Change your position every hour to decrease pain and stiffness.  - Continue the exercises taught to you by your physical therapist.  - No driving until cleared by the doctor.  - No tub baths, hot tubs, or swimming pools until instructed by your doctor.  Prineo tape(glue on skin) removal on/near after Post Op Day # 14 in your Surgeon's office.

## 2024-11-04 NOTE — OCCUPATIONAL THERAPY INITIAL EVALUATION ADULT - ADDITIONAL COMMENTS
Pt lives with  (grandson and daughter to stay for a few days upon d/c home to assist) in  with 2 SALVATORE through garage no HR and 14 steps to bed/bath +HR. Pt has a stall shower with seat. Pt owns RW, commode and RTS. Prior to admission, pt was independent with ADLs, IADLs and functional mobility with cane.

## 2024-11-04 NOTE — PHYSICAL THERAPY INITIAL EVALUATION ADULT - PERTINENT HX OF CURRENT PROBLEM, REHAB EVAL
Pt is a 82 y/o female with left hip primary OA. Pt is s/p left total hip replacement anterior approach on 11/4/24.

## 2024-11-04 NOTE — BRIEF OPERATIVE NOTE - NSICDXBRIEFPREOP_GEN_ALL_CORE_FT
PRE-OP DIAGNOSIS:  Primary localized osteoarthritis of left hip 04-Nov-2024 13:27:01  Mario Hernandez

## 2024-11-04 NOTE — OCCUPATIONAL THERAPY INITIAL EVALUATION ADULT - TRANSFER TRAINING, PT EVAL
Patient will transfer to toilet with DME as needed with modified independence in 2-5 OT sessions to increase independence with toileting tasks.

## 2024-11-04 NOTE — DISCHARGE NOTE PROVIDER - NSDCCPTREATMENT_GEN_ALL_CORE_FT
Tom Lizarraga is a 64 y.o. female who presents today   Chief Complaint   Patient presents with    Follow-up     I am here today for my yearly for nocturnal enuresis       Patient is a 42-year-old female presents to clinic today for yearly follow-up for nocturnal enuresis and overactive bladder. She has been maintained on oxybutynin 5 mg with significant decrease in nocturnal enuresis as well as overactive bladder. She was previously on [place] one-point although her sodium did drop so this was discontinued. She comes in today for refill. 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       Current Outpatient Medications   Medication Sig Dispense Refill    fluticasone (FLONASE) 50 MCG/ACT nasal spray SPRAY 1 SPRAY INTO EACH NOSTRIL TWICE A DAY      VASCEPA 1 g CAPS capsule TAKE CAPSULE(S) ORAL TWO TIMES A DAY TAKE WITH FOOD/MEAL      loratadine (CLARITIN) 10 MG tablet TAKE ONE TABLET BY MOUTH DAILY      rosuvastatin (CRESTOR) 5 MG tablet TAKE 1 TABLET BY MOUTH EVERY DAY IN THE EVENING      oxybutynin (DITROPAN) 5 MG tablet TAKE 1 TABLET BY MOUTH EVERY DAY AT NIGHT 90 tablet 3    traZODone (DESYREL) 50 MG tablet TAKE 1 TABLET BY MOUTH EVERY DAY AT NIGHT 90 tablet 0    eszopiclone (ESZOPICLONE) 3 MG TABS Take 1 tablet by mouth nightly for 30 days.  30 tablet 0    lamoTRIgine (LAMICTAL) 150 MG tablet TAKE 1 TABLET BY MOUTH EVERY DAY 90 tablet 0    risperiDONE (RISPERDAL) 1 MG tablet TAKE 1 TABLET BY MOUTH EVERY DAY AT NIGHT 90 tablet 0    gabapentin (NEURONTIN) 300 MG capsule TAKE 2 CAPSULES BY MOUTH EVERY DAY AT NIGHT 60 capsule 0    cloNIDine (CATAPRES) 0.1 MG tablet TAKE 1 TABLET BY MOUTH EVERY DAY AT NIGHT 90 tablet 0    LATUDA 20 MG TABS tablet TAKE 1 TABLET BY MOUTH DAILY WITH SUPPER 90 tablet 0    spironolactone (ALDACTONE) 25 MG tablet       diclofenac (VOLTAREN) 75 MG EC tablet Take 75 mg by mouth 2 times daily      metoprolol succinate (TOPROL XL) 50 MG extended release tablet Take 50 mg by mouth daily 4/25/19 per direction of Dr. Herbie Kelsey pt to take 1/2 tab daily.  Magnesium Oxide 250 MG TABS Take by mouth daily       No current facility-administered medications for this visit. Allergies   Allergen Reactions    Seroquel [Quetiapine Fumarate]      Pt reports caused her B/P to drop, faint and increase her cholesterol.        Social History     Socioeconomic History    Marital status: Legally      Spouse name: None    Number of children: 1    Years of education: 12th grade + some college    Highest education level: None   Occupational History    None   Tobacco Use    Smoking status: Current Every Day Smoker     Packs/day: 0.75    Smokeless tobacco: Never Used   Vaping Use    Vaping Use: Never used   Substance and Sexual Activity    Alcohol use: Not Currently     Comment: history of abuse, last drink was early December, 2018    Drug use: Not Currently     Types: Marijuana     Comment: last use was summer, 2017    Sexual activity: Not Currently   Other Topics Concern    None   Social History Narrative    PRIOR MEDICATION TRIALS    Trazodone (having more suicidal dreams), at 100mg, not working for sleep    Seroquel (wt gain, 14 lb in 3 months, enuresis, indigestion, abnormal blood work, increased blood sugar, seizures)    Duloxetine (has not been effective and no noticeable change even with dose increases to 90mg)    Paxil, 20mg    Wellbutrin XL, (tried it recently to quit PRINCIPAL PROCEDURE  Procedure: Total left hip replacement  Findings and Treatment: Anterior approach  Your new total hip replacement requires proper care.  Your surgical care provider is your best resource for information.  Physical Therapy/Occupational Therapy for: Ambulation, transfers, stairs, & ADLs, isometrics.  Full weight bearing on both legs; Walker/cane use as instructed by Physical therapy/Occupational therapy.  Anterior Total Hip Replacement precautions for  6 weeks:  - No straight leg raise;  - No external rotation of hip when extended-standing or lying flat; No hyperextension of hip when standing (kickback).  - Use pain medication if needed as prescribed.  Ice packs are a helpful addition to your comfort.  Applying a  waterproof ice pack over a cloth or thin towel to the site for 30 minutes per hour may provide benefit by reducing swelling and discomfort.  [place] dressing is over your hip wound.  It is waterproof and you may shower.  Do not aim shower stream at surgical site and pat dry with clean towel after showering.   Remove [place] dressing 7 days after surgery and leave wound open to air.  - Keep dressing clean.   -Take short, frequent walks increasing the distance that you walk each day as tolerated.  - Change your position every hour to decrease pain and stiffness.  - Continue the exercises taught to you by your physical therapist.  - No driving until cleared by the doctor.  - No tub baths, hot tubs, or swimming pools until instructed by your doctor.  Prineo tape(glue on skin) removal on/near after Post Op Day # 14 in your Surgeon's office.       smoking)    Prozac (made her numb, added to her eating disorder)    Zoloft (thought she did well on this, took for a couple of years, she stopped it because when she returned to [place] the doctor put her back on Prozac)    Remeron (increased her dreams and panic attacks)    [facility] (worked)    Librium (in 2018 for 15 days to stop drinking alcohol)    Risperdal-stopped on 7/6/20 due to weight gain    Doxepin, started on 9/22/20 for sleep (not effective)    Prazosin, ineffective for dreams/nightmares    Latuda (1/27/21, reports that she switched it to am dosing because taking it at night made her RLS worse)        Psychiatric Review of Systems, 3/19/19         Mood:  Sadness, tearfulness, low appetite, low concentration, low energy, loss of interest, denies suicidality today      (Depression: sadness, tearfulness, sleep, appetite, energy, concentration, sexual function, guilt, psychomotor agitation or slowing, interest, suicidality)         Julia: She says that there have been periods of time when she could go 3 days without sleep, she does say that there have been days in a row when she has done reckless, impulsive things      (impulsivity, grandiosity, recklessness, excessive energy, decreased need for sleep, increased spending beyond means, hyperverbal, grandiose, racing thoughts, hypersexuality)         Other: anger from being tired all the time      (Irritability, lability, anger)         Anxiety:  She says that she worries every night about sleeping and panic attacks. She says that she worries about her neighbors. She says that they always want something from her. She worries about running into them. She says that this causes panic attacks.  She says that there is a lot of drug use in her neighborhood and she is fearful of her neighbors.      (Generalized anxiety: where, when, who, how long, how frequent)         Panic Disorder symptoms: She says that she is having panic attacks at night, 1 at night (this has improved from 3 weeks ago before she started Trazodone when she was having 2-3 at night). She says that she wakes up with dreams of her family and with her anxiety about [place].      (Palpitations, racing heart beat, sweating, sense of impending doom, fear of recurrence, shortness of breath)         OCD symptoms:  She gets flustered if things are not in a routine, is ritualized about the way she dresses, and the way she laces her shoes      (checking, cleaning, organizing, rituals, hang-ups, obsessive thoughts, counting, rational vs. Irrational beliefs)         PTSD symptoms:  Increased startle response, wakes up with dreams at night, she also says she has flashbacks during the day.      (nightmares, flashbacks, startle respoinse, avoidance)         Social anxiety symptoms:  Negative         Simple phobias:   Heights, claustrophobia, snakes      (heights, planes, spiders, etc.)         Psychosis: She reports hearing and seeing things that aren't there, sees animals out her window that really aren't there. She says this happens almost every day. She says that she has never seen things when she wasn't depressed.      (hallucinations, auditory, visual, tactile, olfactory)         Paranoia: Feels that people are watching her most of the time.  She thinks this feeling is both irrational and rational.         Delusions:  Negative      (TV, radio, thought broadcasting, mind control, referential thinking)         Patient's perception: Negative      (Spiritual or cultural context of symptoms, reality testing)         ADHD symptoms: Negative         Eating Disorder symptoms:  Positive, lives almost entirely on whole milk, sometimes drinking ensure, she says that in the last month she has only eaten 3 meals, has occasionally eaten a bagel with a plain piece of bread.      (binging, purging, excessive exercising)     Social Determinants of Health     Financial Resource Strain:     Difficulty of Paying Living Expenses: Food Insecurity:     Worried About Running Out of Food in the Last Year:     [address] in the Last Year:    Transportation Needs:     Lack of Transportation (Medical):  Lack of Transportation (Non-Medical):    Physical Activity:     Days of Exercise per Week:     Minutes of Exercise per Session:    Stress:     Feeling of Stress :    Social Connections:     Frequency of Communication with Friends and Family:     Frequency of Social Gatherings with Friends and Family:     Attends Sabianism Services:     Active Member of Clubs or Organizations:     Attends Club or Organization Meetings:     Marital Status:    Intimate Partner Violence:     Fear of Current or Ex-Partner:     Emotionally Abused:     Physically Abused:     Sexually Abused:        Family History   Problem Relation Age of Onset    Depression Mother        REVIEW OF SYSTEMS:  Review of Systems   Constitutional: Negative for chills and fever. Gastrointestinal: Negative for abdominal distention, abdominal pain, nausea and vomiting. Genitourinary: Negative for difficulty urinating, dysuria, flank pain, frequency, hematuria and urgency. Musculoskeletal: Negative for back pain and gait problem. Psychiatric/Behavioral: Negative for agitation and confusion. PHYSICAL EXAM:  Temp 98.4 °F (36.9 °C) (Temporal)   Ht 5' 6\" (1.676 m)   Wt 135 lb 4.8 oz (61.4 kg)   BMI 21.84 kg/m²   Physical Exam  Vitals and nursing note reviewed. Constitutional:       General: She is not in acute distress. Appearance: Normal appearance. She is not ill-appearing. Pulmonary:      Effort: Pulmonary effort is normal. No respiratory distress. Abdominal:      General: There is no distension. Tenderness: There is no abdominal tenderness. There is no right CVA tenderness or left CVA tenderness. Neurological:      Mental Status: She is alert and oriented to person, place, and time. Mental status is at baseline.    Psychiatric: PRINCIPAL PROCEDURE  Procedure: Total left hip replacement  Findings and Treatment: Severe DJD left hip

## 2024-11-04 NOTE — PHYSICAL THERAPY INITIAL EVALUATION ADULT - GAIT TRAINING, PT EVAL
Patient will ambulate 150 feet independently with a rolling walker within 1-2 days for safe community ambulation. Patient will ascend/descend 2 stairs independently with straight cane and 14 stairs independently with +HR and straight cane within 1-2 days for safe household stair negotiation.

## 2024-11-04 NOTE — DISCHARGE NOTE PROVIDER - NSDCFUSCHEDAPPT_GEN_ALL_CORE_FT
Staci Cruz  [facility]  ORTHOSURG [address] [facility]  Scheduled Appointment: 11/21/2024    Eliel Riddle  [facility]  INTMED [address]  Scheduled Appointment: 12/03/2024    Silver Irizarry  [facility]  ORTHOSURG [address] [facility]  Scheduled Appointment: 12/31/2024

## 2024-11-04 NOTE — BRIEF OPERATIVE NOTE - NSICDXBRIEFPOSTOP_GEN_ALL_CORE_FT
POST-OP DIAGNOSIS:  Primary localized osteoarthritis of left hip 04-Nov-2024 13:27:09  Mario Hernandez

## 2024-11-04 NOTE — OCCUPATIONAL THERAPY INITIAL EVALUATION ADULT - ADL RETRAINING, OT EVAL
Patient will dress lower body with modified independence, AE as needed, in 2-5 OT sessions to prepare for ADLs and IADLs.

## 2024-11-04 NOTE — PATIENT PROFILE ADULT - FALL HARM RISK - UNIVERSAL INTERVENTIONS
Bed in lowest position, wheels locked, appropriate side rails in place/Call bell, personal items and telephone in reach/Instruct patient to call for assistance before getting out of bed or chair/Non-slip footwear when patient is out of bed/[place] to call system/Physically safe environment - no spills, clutter or unnecessary equipment/Purposeful Proactive Rounding/Room/bathroom lighting operational, light cord in reach

## 2024-11-04 NOTE — PATIENT PROFILE ADULT - FALL HARM RISK - TYPE OF ASSESSMENT
Admission
You can access the FollowMyHealth Patient Portal offered by [facility] by registering at the following website: [email or web address]. By joining [facility]’s FollowMyHealth portal, you will also be able to view your health information using other applications (apps) compatible with our system.

## 2024-11-04 NOTE — DISCHARGE NOTE PROVIDER - NSDCMRMEDTOKEN_GEN_ALL_CORE_FT
amLODIPine 5 mg oral tablet: 1 tab(s) orally once a day  atorvastatin 10 mg oral tablet: 1 tab(s) orally once a day  diazePAM 5 mg oral tablet: 1 tab(s) orally prn  Eliquis 2.5 mg oral tablet: 1 tab(s) orally every 12 hours 1 tab by mouth every 12 hours for a total of 28 days.  mesalamine 1.2 g oral delayed release tablet: 2 tab(s) orally 4 times a day  Narcan 4 mg/0.1 mL nasal spray: 4 milligram(s) intranasally every 5 minutes as needed for sign of overdose 1 spray into nostril as needed for signs of overdose such as very slow breathing and unresponsiveness, may repeat one time in 5 minutes while awaiting EMS  omeprazole 20 mg oral tablet, disintegrating, delayed release:   Tylenol Extra Strength 500 mg oral tablet: 1-2 tab(s) orally every 6 hours as needed for pain MDD:4 grams   amLODIPine 5 mg oral tablet: 1 tab(s) orally once a day  atorvastatin 10 mg oral tablet: 1 tab(s) orally once a day  diazePAM 5 mg oral tablet: 1 tab(s) orally prn  Eliquis 2.5 mg oral tablet: 1 tab(s) orally every 12 hours 1 tab by mouth every 12 hours for a total of 28 days.  HYDROmorphone 2 mg oral tablet: 1 tab(s) orally every 4 hours as needed for  severe pain Do Not combine with other sedating medications. Do Not Exceed Max Daily dose of 6 tabs. [facility] :[id number] MDD: 6  mesalamine 1.2 g oral delayed release tablet: 2 tab(s) orally 4 times a day  Narcan 4 mg/0.1 mL nasal spray: 4 milligram(s) intranasally every 5 minutes as needed for sign of overdose 1 spray into nostril as needed for signs of overdose such as very slow breathing and unresponsiveness, may repeat one time in 5 minutes while awaiting EMS  omeprazole 20 mg oral tablet, disintegrating, delayed release:   Tylenol Extra Strength 500 mg oral tablet: 1-2 tab(s) orally every 6 hours as needed for pain MDD:4 grams

## 2024-11-04 NOTE — DISCHARGE NOTE PROVIDER - NSDCFUADDINST_GEN_ALL_CORE_FT
For Constipation :   • Increase your water intake. Drink at least 8 glasses of water daily.  • Try adding fiber to your diet by eating fruits, vegetables and foods that are rich in grains.  • If you do experience constipation, you may take an over-the-counter stool softener/laxative such as Chandni Colace, Senekot or  Milk of Magnesia.   For Constipation :   • Increase your water intake. Drink at least 8 glasses of water daily.  • Try adding fiber to your diet by eating fruits, vegetables and foods that are rich in grains.  • If you do experience constipation, you may take an over-the-counter stool softener/laxative such as Chandni Colace, Senekot or  Milk of Magnesia.    - Call your doctor if you experience:  • An increase in pain not controlled by pain medication or change in activity or  position.  • Temperature greater than 101° F.  • Redness, increased swelling or foul smelling drainage from or around the  incision.  • Numbness, tingling or a change in color or temperature of the operative leg.  • Call your doctor immediately if you experience chest pain, shortness of breath or calf pain.

## 2024-11-04 NOTE — DISCHARGE NOTE PROVIDER - HOSPITAL COURSE
The patient is an 83 year old female with severe osteoarthritis of the left hip.  After admission on November 4, 2024 and receiving pre-operative parenteral prophylactic antibiotics, the patient  underwent an  uncomplicated Left anterior total hip replacement by orthopedic surgeon Dr. Silver Irizarry.    A medical consultation from the Hospitalist service was obtained for post-operative medical co-management. Typical Physical & occupational therapy modalities post anterior total hip replacement  were performed including ambulation training, range of motion, ADL's, and transfers. Eliquis was given for DVT prophylaxis.  The patient had a clean appearing surgical dressing with no sign of surgical site infections and had a stable neuro / vascular exam of the operated extremity.  After progression of mobility guided by the PT/ OT staff,  the patient was felt to benefit from further rehabilitative care to increase their level of function. This was felt to best be accomplished in a home setting.  Discharge and Orthopedic Care instructions were delineated in the Discharge Plan and reviewed with the patient. All medications were delineated in the medication reconciliation tool and key points were reviewed with the patient. The patient was deemed stable from an Orthopedic & medical standpoint for discharge today.  They will be going to  Dr. Irizarry's  for office  follow up Orthopedic care at 2 weeks postop or before if necessary.   The patient is an 83 year old female with severe osteoarthritis of the left hip.  After admission on November 4, 2024 and receiving pre-operative parenteral prophylactic antibiotics, the patient  underwent an  uncomplicated Left anterior total hip replacement by orthopedic surgeon Dr. Silver Irizarry.    A medical consultation from the Hospitalist service was obtained for post-operative medical co-management. Typical Physical & occupational therapy modalities post anterior total hip replacement  were performed including ambulation training, range of motion, ADL's, and transfers. Eliquis was given for DVT prophylaxis.  The patient had a clean appearing surgical dressing with no sign of surgical site infections and had a stable neuro / vascular exam of the operated extremity.  After progression of mobility guided by the PT/ OT staff,  the patient was felt to benefit from further rehabilitative care to increase their level of function. This was felt to best be accomplished in a home setting.  Discharge and Orthopedic Care instructions were delineated in the Discharge Plan and reviewed with the patient. All medications were delineated in the medication reconciliation tool and key points were reviewed with the patient. The patient was deemed stable from an Orthopedic & medical standpoint for discharge today.  They will be going to  Dr. Irizarry's  for office  follow up Orthopedic care at 2 weeks postop or before if necessary. Patient is going home with home PT

## 2024-11-04 NOTE — PATIENT PROFILE ADULT - NSTRANSFERBELONGINGSDISPO_GEN_A_NUR
when they seem to have the same symptoms. What may be good for you may be harmful to others. · If you are no longer taking a prescribed medication and you have pills left please take your pills out of their original containers. Mix crushed pills with an undesirable substance, such as cat litter or used coffee grounds. Put the mixture into a disposable container with a lid, such as an empty margarine tub, or into a sealable bag. Cover up or remove any of your personal information on the empty containers by covering it with black permanent marker or duct tape. Place the sealed container with the mixture, and the empty drug containers, in the trash. · If you use a medication that is in the form of a patch, dispose of used patches by folding them in half so that the sticky sides meet, and then flushing them down a toilet. They should not be placed in the household trash where children or pets can find them. · If you have any questions, ask your provider or pharmacist for more information. · Be sure to keep all appointments for provider visits or tests.
not applicable

## 2024-11-04 NOTE — CONSULT NOTE ADULT - SUBJECTIVE AND OBJECTIVE BOX
HPI:  83F with hx of HTN, HLD, hx of breast CA, hx of renal cell carcinoma (hx of radiation and surg, none recently), osteoarthritis, hx of ulcerative colitis, gerd, who presents electively for left hip surgery.  Pain in the hip was getting progressively worse and had trouble moving.  Recommended for surgery.  Patient seen in PACU.  Pain is currently controlled.  No numbness or weakness reported.  No nausea.  No complaints.      PAST MEDICAL & SURGICAL HISTORY:  HTN (hypertension)  HLD (hyperlipidemia)  Skin cancer  Left hip pain  Osteoarthritis of left hip  Anxiety  Insomnia  GERD (gastroesophageal reflux disease)  Breast cancer  History of Torres esophagus  H/O ulcerative colitis  Carcinoma, renal cell  History of squamous cell carcinoma excision  Uterine prolapse  Lung nodule  History of repair of right hip joint  H/O left nephrectomy  S/P removal of left ovary  S/P appendectomy  S/P lumpectomy, left breast  Acquired absence of left kidney  History of immunotherapy    REVIEW OF SYSTEMS:  CONSTITUTIONAL:    no fever or weight loss or fatigue  EYES:    no eye pain or visual disturbances or discharge  ENMT:     no difficulty hearing or tinnitus or vertigo, no sinus or throat pain  NECK:    no pain or stiffness  RESPIRATORY:    no cough or wheezing or chills or hemoptysis, no shortness of breath  CARDIOVASCULAR:    no chest pain or palpitations or dizziness or leg swelling  GASTROINTESTINAL:    no abdominal or epigastric pain. no nausea or vomiting or hematemesis, no diarrhea or constipation. no melena or hematochezia.  GENITOURINARY:    no dysuria or frequency or hematuria or incontinence  NEUROLOGICAL:    no headaches or memory loss or loss of strength or numbness or tremors  SKIN:    no itching or burning or rashes or lesions   LYMPH NODES:    no enlarged glands  ENDOCRINE:    no heat or cold intolerance, no hair loss, no polydipsia or polyuria  MUSCULOSKELETAL:    no joint pain or swelling, no muscle or back or extremity pain  PSYCHIATRIC:    no depression or anxiety or mood swings or difficulty sleeping  HEME/LYMPH:    no easy bruising or bleeding gums  ALLERGY AND IMMUNOLOGIC:    no hives or eczema      HOME MEDICATIONS:    · 	Tylenol Extra Strength 500 mg oral tablet: Last Dose Taken:  , 1-2 tab(s) orally every 6 hours as needed for pain MDD:4 grams  · 	omeprazole 20 mg oral tablet, disintegrating, delayed release: Last Dose Taken:    · 	atorvastatin 10 mg oral tablet: Last Dose Taken:  , 1 tab(s) orally once a day  · 	amLODIPine 5 mg oral tablet: Last Dose Taken:  , 1 tab(s) orally once a day  · 	diazePAM 5 mg oral tablet: Last Dose Taken:  , 1 tab(s) orally prn  · 	mesalamine 1.2 g oral delayed release tablet: Last Dose Taken:  , 2 tab(s) orally TWO times a day    ALLERGIES and INTOLERANCES:  No Known Allergies    SOCIAL HISTORY:  - no reported smoking or etoh use    FAMILY HISTORY:  FHx: diabetic complications (Father, Mother)  FH: heart disease (Father, Mother)    PHYSICAL EXAM:  Vital Signs Last 24 Hrs  T(C): 36.8 (04 Nov 2024 16:55), Max: 36.8 (04 Nov 2024 16:55)  T(F): 98.3 (04 Nov 2024 16:55), Max: 98.3 (04 Nov 2024 16:55)  HR: 69 (04 Nov 2024 16:55) (56 - 81)  BP: 146/79 (04 Nov 2024 16:55) (111/60 - 146/79)  BP(mean): --  RR: 20 (04 Nov 2024 16:55) (14 - 24)  SpO2: 100% (04 Nov 2024 16:55) (98% - 100%)    Parameters below as of 04 Nov 2024 16:55  Patient On (Oxygen Delivery Method): room air        GENERAL:     age appropriate, in NAD  HEAD:     atraumatic, normocephalic  EYES:     EOMI, conjunctiva and sclera clear  RESPIRATORY:     clear to auscultation bilaterally, no rales or rhonchi or wheezing or rubs  CARDIOVASCULAR:     regular rate and rhythm, no murmurs or rubs or gallops  GASTROINTESTINAL:     soft, nontender, nondistended, bowel sounds present  EXTREMITIES:     no clubbing or cyanosis or edema  MUSCULOSKELETAL:     no joint pain or swelling or deformities  NERVOUS SYSTEM:     moves all toes and both feet, no sensory deficits noted, has intermittent head bobbing and tremors  PSYCH:     appropriate, alert and orientated x3, good concentration      LABS:                        12.8   7.13  )-----------( 205      ( 18 Oct 2024 08:39 )             39.9     18 Oct 2024 08:39    138    |  105    |  22     ----------------------------<  158[H]  3.5     |  25     |  1.09         Ca    9.1        18 Oct 2024 08:39    TPro  7.1    /  Alb  3.1[L]  /  TBili  0.5    /  DBili  x      /  AST  17     /  ALT  29     /  AlkPhos  126[H]  18 Oct 2024 08:39    LIVER FUNCTIONS - ( 18 Oct 2024 08:39 )  Alb: 3.1 g/dL / Pro: 7.1 g/dL / ALK PHOS: 126 U/L / ALT: 29 U/L / AST: 17 U/L / GGT: x           PT/INR - ( 18 Oct 2024 08:39 )   PT: 11.5 ;   INR: 0.99          PTT - ( 18 Oct 2024 08:39 )  PTT:29.6

## 2024-11-04 NOTE — CONSULT NOTE ADULT - ASSESSMENT
83F with hx of HTN, HLD, hx of breast CA, hx of renal cell carcinoma (hx of radiation and surg, none recently), osteoarthritis, hx of ulcerative colitis, gerd, who presents electively for left hip surgery.    Left hip surgery due to osteoarthritis  - reviewed patient's medical clearance and other outpatient notes  - post-op care as per ortho  - DVT ppx with aspirin 81mg vs. eliquis 2.5mg BID   - pain control with standing tylenol 1000mg, celebrex 200mg BID, oxycodone 5mg and 10mg for mod/severe respectively pain, dilaudid 0.5mg IV for breakthrough pain  - monitor for respiratory depression  - PT/ OT  - advance diet as tolerated  - anti-emetics PRN  - incentive spirometer  - bowel regiment    Hx of ulcerative colitis  - cont with mesalamine 2.4g BID (not 4 times a day)    GERD  - cont with omeprazole or equivalent    HLD   - cont with atorvastatin  - cont with amlodipine on POD #2    Insomina  - cont with valium PRN     Preventive measures  - eliquis 2.5mg BID

## 2024-11-04 NOTE — OCCUPATIONAL THERAPY INITIAL EVALUATION ADULT - BED MOBILITY TRAINING, PT EVAL
Patient will perform bed mobility skills (scooting, supine to sit, sit to supine) with independence to participate in EOB activities in 2-5 OT sessions.

## 2024-11-04 NOTE — DISCHARGE NOTE PROVIDER - CARE PROVIDER_API CALL
Silver Irizarry  Orthopaedic Surgery  [address], [address]  [place] [address]  Phone: [phone]  Fax: [phone]  Scheduled Appointment: 11/21/2024 10:30 AM

## 2024-11-04 NOTE — PHYSICAL THERAPY INITIAL EVALUATION ADULT - GAIT DEVIATIONS NOTED, PT EVAL
decreased domenico/increased time in double stance/decreased step length/decreased weight-shifting ability

## 2024-11-04 NOTE — PHYSICAL THERAPY INITIAL EVALUATION ADULT - STANDING BALANCE: STATIC
levothyroxine (SYNTHROID/LEVOTHROID) 112 MCG tablet      Last Written Prescription Date:  12-11-20  Last Fill Quantity: 90,   # refills: 3  Last Office Visit: 1-22-21  Future Office visit:    Next 5 appointments (look out 90 days)    Jan 28, 2022  9:20 AM  (Arrive by 9:05 AM)  Office Visit with Tiny Issa NP  [facility] ([facility] [place] ) [address]  [place] [address]  [phone]           Routing refill request to provider for review/approval because:   Normal TSH on file in past 12 months    TSH   Date Value Ref Range Status   12/10/2020 2.77 0.40 - 4.00 mU/L Final         
with RW/good minus

## 2024-11-04 NOTE — DISCHARGE NOTE PROVIDER - YES NO FOR MLM POSITIVE OR NEGATIVE COVID RESULT
The patient's PFTs are amenable to surgery.  Have given him Dr. Brian Narayanan is number to make an appointment.   ,

## 2024-11-04 NOTE — PHYSICAL THERAPY INITIAL EVALUATION ADULT - ADDITIONAL COMMENTS
Pt lives with  in a private house, there are 2 stairs to enter and 14 stairs +HR to the primary bedroom/bathroom. Pt has a walk in shower. PTA pt was independent with ADLs and ambulation. Pt reports she occasionally utilized a straight cane. Pt owns a RW, commode and cane. Pt reports her grandson and daughter will be able to assist upon return home.

## 2024-11-05 ENCOUNTER — TRANSCRIPTION ENCOUNTER (OUTPATIENT)
Age: 84
End: 2024-11-05

## 2024-11-05 VITALS
OXYGEN SATURATION: 99 % | HEART RATE: 65 BPM | SYSTOLIC BLOOD PRESSURE: 136 MMHG | DIASTOLIC BLOOD PRESSURE: 77 MMHG | RESPIRATION RATE: 15 BRPM | TEMPERATURE: 98 F

## 2024-11-05 LAB
ANION GAP SERPL CALC-SCNC: 11 MMOL/L — SIGNIFICANT CHANGE UP (ref 5–17)
BUN SERPL-MCNC: 17 MG/DL — SIGNIFICANT CHANGE UP (ref 7–23)
CALCIUM SERPL-MCNC: 8.8 MG/DL — SIGNIFICANT CHANGE UP (ref 8.4–10.5)
CHLORIDE SERPL-SCNC: 107 MMOL/L — SIGNIFICANT CHANGE UP (ref 96–108)
CO2 SERPL-SCNC: 26 MMOL/L — SIGNIFICANT CHANGE UP (ref 22–31)
CREAT SERPL-MCNC: 1.1 MG/DL — SIGNIFICANT CHANGE UP (ref 0.5–1.3)
EGFR: 50 ML/MIN/1.73M2 — LOW
GLUCOSE SERPL-MCNC: 133 MG/DL — HIGH (ref 70–99)
HCT VFR BLD CALC: 34.1 % — LOW (ref 34.5–45)
HGB BLD-MCNC: 11.2 G/DL — LOW (ref 11.5–15.5)
MCHC RBC-ENTMCNC: 29.3 PG — SIGNIFICANT CHANGE UP (ref 27–34)
MCHC RBC-ENTMCNC: 32.8 G/DL — SIGNIFICANT CHANGE UP (ref 32–36)
MCV RBC AUTO: 89.3 FL — SIGNIFICANT CHANGE UP (ref 80–100)
NRBC # BLD: 0 /100 WBCS — SIGNIFICANT CHANGE UP (ref 0–0)
PLATELET # BLD AUTO: 195 K/UL — SIGNIFICANT CHANGE UP (ref 150–400)
POTASSIUM SERPL-MCNC: 3.9 MMOL/L — SIGNIFICANT CHANGE UP (ref 3.5–5.3)
POTASSIUM SERPL-SCNC: 3.9 MMOL/L — SIGNIFICANT CHANGE UP (ref 3.5–5.3)
RBC # BLD: 3.82 M/UL — SIGNIFICANT CHANGE UP (ref 3.8–5.2)
RBC # FLD: 12.7 % — SIGNIFICANT CHANGE UP (ref 10.3–14.5)
SODIUM SERPL-SCNC: 144 MMOL/L — SIGNIFICANT CHANGE UP (ref 135–145)
WBC # BLD: 18.31 K/UL — HIGH (ref 3.8–10.5)
WBC # FLD AUTO: 18.31 K/UL — HIGH (ref 3.8–10.5)

## 2024-11-05 PROCEDURE — 99232 SBSQ HOSP IP/OBS MODERATE 35: CPT

## 2024-11-05 RX ORDER — HYDROMORPHONE HCL/0.9% NACL/PF 6 MG/30 ML
2 PATIENT CONTROLLED ANALGESIA SYRINGE INTRAVENOUS
Refills: 0 | Status: DISCONTINUED | OUTPATIENT
Start: 2024-11-05 | End: 2024-11-05

## 2024-11-05 RX ORDER — HYDROMORPHONE HCL/0.9% NACL/PF 6 MG/30 ML
1 PATIENT CONTROLLED ANALGESIA SYRINGE INTRAVENOUS
Qty: 18 | Refills: 0
Start: 2024-11-05 | End: 2024-11-07

## 2024-11-05 RX ORDER — HYDROMORPHONE HCL/0.9% NACL/PF 6 MG/30 ML
4 PATIENT CONTROLLED ANALGESIA SYRINGE INTRAVENOUS
Refills: 0 | Status: DISCONTINUED | OUTPATIENT
Start: 2024-11-05 | End: 2024-11-05

## 2024-11-05 RX ADMIN — Medication 1000 MILLIGRAM(S): at 01:19

## 2024-11-05 RX ADMIN — MESALAMINE 2.4 GRAM(S): 1.2 TABLET, DELAYED RELEASE ORAL at 08:53

## 2024-11-05 RX ADMIN — Medication 1000 MILLIGRAM(S): at 05:28

## 2024-11-05 RX ADMIN — Medication 400 MILLIGRAM(S): at 01:14

## 2024-11-05 RX ADMIN — Medication 100 MILLIGRAM(S): at 03:04

## 2024-11-05 RX ADMIN — Medication 1000 MILLIGRAM(S): at 05:33

## 2024-11-05 RX ADMIN — PANTOPRAZOLE SODIUM 40 MILLIGRAM(S): 40 TABLET, DELAYED RELEASE ORAL at 05:29

## 2024-11-05 RX ADMIN — APIXABAN 2.5 MILLIGRAM(S): 5 TABLET, FILM COATED ORAL at 08:53

## 2024-11-05 RX ADMIN — DEXAMETHASONE 1.5 MG 101.6 MILLIGRAM(S): 1.5 TABLET ORAL at 05:29

## 2024-11-05 NOTE — PATIENT CHOICE NOTE. - NSPTCHOICESTATE_GEN_ALL_CORE

## 2024-11-05 NOTE — PROGRESS NOTE ADULT - ASSESSMENT
83F with hx of HTN, HLD, hx of breast CA, hx of renal cell carcinoma (hx of radiation and surg, none recently), osteoarthritis, hx of ulcerative colitis, gerd, who presents electively for left hip surgery.    Left hip surgery due to osteoarthritis  - medically stable for DC   - post-op care as per ortho  - DVT ppx with eliquis 2.5mg BID   - pain control with standing tylenol 1000mg, dilaudid 2mg and 4mg for mod/severe respectively pain, dilaudid 0.5mg IV for breakthrough pain  - monitor for respiratory depression  - PT/ OT  - advance diet as tolerated  - anti-emetics PRN  - incentive spirometer  - bowel regiment    Hx of ulcerative colitis  - cont with mesalamine 2.4g BID (not 4 times a day)    GERD  - cont with omeprazole or equivalent    HLD   - cont with atorvastatin  - cont with amlodipine on POD #2    Insomina  - cont with valium PRN     Preventive measures  - eliquis 2.5mg BID

## 2024-11-05 NOTE — DISCHARGE NOTE NURSING/CASE MANAGEMENT/SOCIAL WORK - NSDCPEFALRISK_GEN_ALL_CORE
For information on Fall & Injury Prevention, visit: [email or web address] OR  [email or web address] OR  https://www.cdc.gov/steadi/patient.html

## 2024-11-05 NOTE — CARE COORDINATION ASSESSMENT. - CAN THE PATIENT MANAGE THEIR OWN HEALTHCARE/FINANCIAL NEEDS?
At this time you must quarantine at home until you have   3 days without fever  2.   Must have not taken any fever reducing medications   3.  Other symptoms such as cough should be improving.  4.  Must have been at least 7 days since first symptom.    At this time it is important to self quarantine at home and to call the [facility] at [phone] or text JEANCARLOSD to [phone] for further advice on when quarantine may be lifted.  You may talk to a doctor by virtual visit by going to [email or web address] or [email or web address] or call your primary care doctor for further advice.  You should return to the ER immediately if you have difficulty breathing, have any worsening symptoms or any concerns. You may call [phone] for 24/7 Covid-19 information.    Places for Testing         Jordyn (for [facility] and [facility] patients only)                   [facility] [address] [place] [address]                             [facility] [place] [address], [address]  [place] [address]    [place] [facility]  [address] [place] [address]          [facility] [address]        [address]         [place], [address]          [facility] [address] [place] [address]          [facility] [address]        [address] [place] [facility]  [address]  [place] [address]                     Yes

## 2024-11-05 NOTE — CARE COORDINATION ASSESSMENT. - OTHER PERTINENT DISCHARGE PLANNING INFORMATION:
Met patient at bedside.  Explained role of CM, verbalized understanding. Pt was made aware a CM will remain available through hospitalization.  Contact information given in discharge/ transitions resource folder. Pt. reports she lives in a house w/ and 14 steps to bedroom and 2STE home. Reports she is Independent w/ADLs and ambulation.    Met patient at bedside.  Explained role of CM, verbalized understanding. Pt was made aware a CM will remain available through hospitalization.  Contact information given in discharge/ transitions resource folder. Pt owns a RW, commode and cane. Pt. reports she lives in a house w/ and 14 steps to bedroom and 2STE home. Reports she is Independent w/ADLs and ambulation.

## 2024-11-05 NOTE — DISCHARGE NOTE NURSING/CASE MANAGEMENT/SOCIAL WORK - FINANCIAL ASSISTANCE
[facility] provides services at a reduced cost to those who are determined to be eligible through [facility]’s financial assistance program. Information regarding [facility]’s financial assistance program can be found by going to [email or web address] or by calling [phone].

## 2024-11-05 NOTE — CAREGIVER ENGAGEMENT NOTE - CAREGIVER OUTREACH NOTES - FREE TEXT
Pt. declined to identify caregiver. Reports her grandson will transport her home and she will call him. CM remains available throughout stay.

## 2024-11-05 NOTE — CARE COORDINATION ASSESSMENT. - NSCAREPROVIDERS_GEN_ALL_CORE_FT
CARE PROVIDERS:  Administration: Lissa Coronel  Administration: Sonia Castillo  Administration: Umang Obando  Admitting: Silver Irizarry  Attending: Silver Irizarry  Case Management: Santaromana, Anna  Consultant: Jesus Pate  Covering Team: Rome Villalobos  Nurse: Tianna Block  Nurse: Alyssa Renteria  Nurse: Lorelei Morris  Nurse: Connie Rivera  Occupational Therapy: Maribeth Rivera  Occupational Therapy: Israel Yuen  Patient Logistics: Bina Mann  PCA/Nursing Assistant: Eunice Scott  Physical Therapy: Hugo Heck  Primary Team: Mario Hernandez  Primary Team: Emeka Husain  Primary Team: Dudley Parkinson  Primary Team: Han Sanabria  Primary Team: Db Sidhu  Primary Team: Naty Padilla  Research: Dudley Harris  : Rosie Gorman  : Vielka Lopez  Team: [facility] Hospitalists, Team  UR// Supp. Assoc.: Elizabeth Calvert   CARE PROVIDERS:  Administration: Lissa Coronel  Administration: Sonia Castillo  Administration: Umang Obando  Admitting: Silver Irizarry  Attending: Silver Irizarry  Case Management: Santaromana, Anna  Covering Team: Rome Villalobos  Nurse: Tianna Block  Nurse: Alyssa Renteria  Nurse: Lorelei Morris  Nurse: Connie Rivera  Occupational Therapy: Israel Yuen  Occupational Therapy: Maribeth Rivera  Patient Logistics: Bina Mann  PCA/Nursing Assistant: Eunice Scott  Physical Therapy: Hugo Heck  Primary Team: Mario Hernandez  Primary Team: Emeka Husain  Primary Team: Dudley Parkinson  Primary Team: Han Sanabria  Primary Team: Db Sidhu  Primary Team: Naty Padilla  Research: Dudley Harris  : Vielka Lopez  : Rosie Gorman  Team: [facility] Hospitalists, Team  UR// Supp. Assoc.: Elizabeth Calvert

## 2024-11-05 NOTE — DISCHARGE NOTE NURSING/CASE MANAGEMENT/SOCIAL WORK - PATIENT PORTAL LINK FT
You can access the FollowMyHealth Patient Portal offered by [facility] by registering at the following website: [email or web address]. By joining [employer]’s FollowMyHealth portal, you will also be able to view your health information using other applications (apps) compatible with our system.

## 2024-11-05 NOTE — CARE COORDINATION ASSESSMENT. - NSPASTMEDSURGHISTORY_GEN_ALL_CORE_FT
PAST MEDICAL & SURGICAL HISTORY:  Skin cancer      HLD (hyperlipidemia)      HTN (hypertension)      Lung nodule      Uterine prolapse      History of squamous cell carcinoma excision      Carcinoma, renal cell      H/O ulcerative colitis      History of Torres esophagus      Breast cancer      GERD (gastroesophageal reflux disease)      Insomnia      Anxiety      Osteoarthritis of left hip      Left hip pain      History of immunotherapy      Acquired absence of left kidney      S/P lumpectomy, left breast      S/P appendectomy      S/P removal of left ovary      H/O left nephrectomy      History of repair of right hip joint

## 2024-11-05 NOTE — PROGRESS NOTE ADULT - SUBJECTIVE AND OBJECTIVE BOX
Discharge medication calendar::  Eliquis 2.5mg q12h x 4 weeks   Omeprazole 20mg QAM home med  No NSAIDs  APAP 1000mg q8h x 2-3 weeks  Narcotic PRN  Docusate 100mg TID while taking narcotic  Miralax, Senna, or Bisacodyl PRN for treatment of constipation  Narcan 4mg nasal spray prescribed for high risk opioid patient   
Discharge medication calendar:  ASA EC 81mg q12h x 4 weeks  Omeprazole 20mg QAM x 4 weeks    APAP 1000mg q8h x 2-3 weeks  Narcotic PRN  Docusate 100mg TID while taking narcotic  Miralax, Senna, or Bisacodyl PRN for treatment of constipation  no nsaids  
POST OPERATIVE DAY #:   1  STATUS POST: Left Anterior THR                       SUBJECTIVE: Patient seen and examined. Ambulated with PT. Tolerating diet. Voiding. Patient is resting in bed and ready to go home.   Reported Pain score = 2/10, Tolerating all medications and pain is well controlled on current regimen. Requested only 3 days of narcotics for emergencies only. Dilaudid regimen due to prior nephrectomy. No NSAIDs  Denies: CP/SOB/N/V/Numbness/Tingling    OBJECTIVE:     Vital Signs Last 24 Hrs  T(C): 36.7 (05 Nov 2024 08:09), Max: 36.8 (04 Nov 2024 16:55)  T(F): 98 (05 Nov 2024 08:09), Max: 98.3 (04 Nov 2024 16:55)  HR: 66 (05 Nov 2024 08:09) (55 - 81)  BP: 146/79 (05 Nov 2024 08:09) (111/60 - 146/79)  RR: 16 (05 Nov 2024 08:09) (14 - 24)  SpO2: 98% (05 Nov 2024 08:09) (98% - 100%)    Parameters below as of 05 Nov 2024 08:09  Patient On (Oxygen Delivery Method): room air      Gen: AAOx3, NAD  Abd: soft, NT, ND  Left hip:          SIlverlon Dressing: clean/dry/intact, no erythema or discharge noted  Bilateral LEs:         Sensation:  intact to light touch          Motor exam:  5/5 dorsiflexion/plantarflexion/EHL          2+ DP pulses          calf supple, NT         SCDs in place    LABS:                        11.2   18.31 )-----------( 195      ( 05 Nov 2024 06:00 )             34.1     11-05    144  |  107  |  17  ----------------------------<  133[H]  3.9   |  26  |  1.10    Ca    8.8      05 Nov 2024 06:00        Urinalysis Basic - ( 05 Nov 2024 06:00 )    Color: x / Appearance: x / SG: x / pH: x  Gluc: 133 mg/dL / Ketone: x  / Bili: x / Urobili: x   Blood: x / Protein: x / Nitrite: x   Leuk Esterase: x / RBC: x / WBC x   Sq Epi: x / Non Sq Epi: x / Bacteria: x        MEDICATIONS:  Anticoagulation:  apixaban 2.5 milliGRAM(s) Oral every 12 hours      Pain medications:   acetaminophen     Tablet .. 1000 milliGRAM(s) Oral every 8 hours  HYDROmorphone   Tablet 2 milliGRAM(s) Oral every 3 hours PRN  HYDROmorphone   Tablet 4 milliGRAM(s) Oral every 3 hours PRN  HYDROmorphone  Injectable 0.5 milliGRAM(s) IV Push every 3 hours PRN        A/P : 83y Female  s/p Left Anterior THR POD # 1  -    Pain control Acetaminophen, Dilaudid  -    No NSAIDs and dilaudid regimen for pain due to prior nephrectomy  -    DVT ppx: Eliquis 2.5mg q 12 h   -    Incentive Spirometry  -    Cryotherapy with protective barrier on skin  -    Medicine co-management  -    Weight bearing status: WBAT   -    Continue anterior total hip precautions  -    Physical Therapy  -    Occupational Therapy  -    Discharge plan: home today pending PT, OT, and Medicine clearance 
Patient is a 83y old  Female who presents with a chief complaint of left hip surgery (04 Nov 2024 16:57)    INTERVAL HPI/OVERNIGHT EVENTS:  No major acute events overnight.  This AM, patient has only some discomfort but manageable.  Has some numbness in the surgical area.  No numbness or weakness in her feet.  No nausea.  No other complaints.      MEDICATIONS  (STANDING):  acetaminophen     Tablet .. 1000 milliGRAM(s) Oral every 8 hours  apixaban 2.5 milliGRAM(s) Oral every 12 hours  atorvastatin 10 milliGRAM(s) Oral at bedtime  lactated ringers. 1000 milliLiter(s) (100 mL/Hr) IV Continuous <Continuous>  mesalamine DR (24-Hour) Tablet 2.4 Gram(s) Oral with breakfast  pantoprazole    Tablet 40 milliGRAM(s) Oral before breakfast  polyethylene glycol 3350 17 Gram(s) Oral at bedtime  senna 2 Tablet(s) Oral at bedtime    MEDICATIONS  (PRN):  diazepam    Tablet 5 milliGRAM(s) Oral every 12 hours PRN Anxiety  HYDROmorphone   Tablet 2 milliGRAM(s) Oral every 3 hours PRN Moderate Pain (4 - 6)  HYDROmorphone   Tablet 4 milliGRAM(s) Oral every 3 hours PRN Severe Pain (7 - 10)  HYDROmorphone  Injectable 0.5 milliGRAM(s) IV Push every 3 hours PRN Breakthrough pain  magnesium hydroxide Suspension 30 milliLiter(s) Oral daily PRN Constipation  ondansetron Injectable 4 milliGRAM(s) IV Push every 6 hours PRN Nausea and/or Vomiting      Allergies  No Known Allergies    ROS as above and reviewed and is otherwise negative    PHYSICAL EXAM:  Vital Signs Last 24 Hrs  T(C): 36.7 (05 Nov 2024 08:09), Max: 36.8 (04 Nov 2024 16:55)  T(F): 98 (05 Nov 2024 08:09), Max: 98.3 (04 Nov 2024 16:55)  HR: 66 (05 Nov 2024 08:09) (55 - 76)  BP: 146/79 (05 Nov 2024 08:09) (111/60 - 146/79)  BP(mean): --  RR: 16 (05 Nov 2024 08:09) (14 - 24)  SpO2: 98% (05 Nov 2024 08:09) (98% - 100%)    Parameters below as of 05 Nov 2024 08:09  Patient On (Oxygen Delivery Method): room air    GENERAL:     age appropriate, in NAD  HEAD:     atraumatic, normocephalic  EYES:     EOMI, conjunctiva and sclera clear  RESPIRATORY:     clear to auscultation bilaterally, no rales or rhonchi or wheezing or rubs  CARDIOVASCULAR:     regular rate and rhythm, no murmurs or rubs or gallops  GASTROINTESTINAL:     soft, nontender, nondistended, bowel sounds present  EXTREMITIES:     no clubbing or cyanosis or edema  MUSCULOSKELETAL:     no joint pain or swelling or deformities  NERVOUS SYSTEM:     moves all toes and both feet, no sensory deficits noted, less intermittent head bobbing and tremors today  PSYCH:     appropriate, alert and orientated x3, good concentration      LABS:                        11.2   18.31 )-----------( 195      ( 05 Nov 2024 06:00 )             34.1     05 Nov 2024 06:00    144    |  107    |  17     ----------------------------<  133    3.9     |  26     |  1.10     Ca    8.8        05 Nov 2024 06:00        Urinalysis Basic - ( 05 Nov 2024 06:00 )    Color: x / Appearance: x / SG: x / pH: x  Gluc: 133 mg/dL / Ketone: x  / Bili: x / Urobili: x   Blood: x / Protein: x / Nitrite: x   Leuk Esterase: x / RBC: x / WBC x   Sq Epi: x / Non Sq Epi: x / Bacteria: x      CAPILLARY BLOOD GLUCOSE

## 2024-11-05 NOTE — CASE MANAGEMENT PROGRESS NOTE - NSCMPROGRESSNOTE_GEN_ALL_CORE
Per MD, patient is medically cleared for discharge home today.  CM met with patient to discuss discharge disposition and home care acceptance with [facility].  Pt. states she has a RW at home and will ask her family to bring it. She stated her grandson will transport her home. Patient verbalized understanding of the transition plan and is in agreement.  CM remains available throughout hospital stay.

## 2024-11-05 NOTE — DISCHARGE NOTE NURSING/CASE MANAGEMENT/SOCIAL WORK - NSSCTYPOFSERV_GEN_ALL_CORE
[facility]/ Home Care Services with [facility] ( / [phone] )  Home Care RN will call within 24/48 hrs of DC from the hospital to set up Initial Assessment.

## 2024-11-05 NOTE — CARE COORDINATION ASSESSMENT. - ASSESSMENT CONCERNS TO BE ADDRESSED
care coordination/discharge planning Patient is a 83y old  Female who presents with a chief complaint of left hip surgery/care coordination/discharge planning

## 2024-11-14 ENCOUNTER — NON-APPOINTMENT (OUTPATIENT)
Age: 84
End: 2024-11-14

## 2024-11-14 PROBLEM — Z96.642 STATUS POST LEFT HIP REPLACEMENT: Status: ACTIVE | Noted: 2024-11-14

## 2024-11-20 PROCEDURE — 87641 MR-STAPH DNA AMP PROBE: CPT

## 2024-11-20 PROCEDURE — 86900 BLOOD TYPING SEROLOGIC ABO: CPT

## 2024-11-20 PROCEDURE — 93005 ELECTROCARDIOGRAM TRACING: CPT

## 2024-11-20 PROCEDURE — 85027 COMPLETE CBC AUTOMATED: CPT

## 2024-11-20 PROCEDURE — 36415 COLL VENOUS BLD VENIPUNCTURE: CPT

## 2024-11-20 PROCEDURE — 86901 BLOOD TYPING SEROLOGIC RH(D): CPT

## 2024-11-20 PROCEDURE — 85610 PROTHROMBIN TIME: CPT

## 2024-11-20 PROCEDURE — G0463: CPT

## 2024-11-20 PROCEDURE — 85730 THROMBOPLASTIN TIME PARTIAL: CPT

## 2024-11-20 PROCEDURE — 80053 COMPREHEN METABOLIC PANEL: CPT

## 2024-11-20 PROCEDURE — 87640 STAPH A DNA AMP PROBE: CPT

## 2024-11-20 PROCEDURE — 86850 RBC ANTIBODY SCREEN: CPT

## 2024-11-20 PROCEDURE — 83036 HEMOGLOBIN GLYCOSYLATED A1C: CPT

## 2024-11-21 ENCOUNTER — APPOINTMENT (OUTPATIENT)
Dept: ORTHOPEDIC SURGERY | Facility: CLINIC | Age: 84
End: 2024-11-21
Payer: MEDICARE

## 2024-11-21 VITALS — WEIGHT: 118 LBS | HEIGHT: 64 IN | TEMPERATURE: 98.2 F | BODY MASS INDEX: 20.14 KG/M2

## 2024-11-21 DIAGNOSIS — Z96.642 PRESENCE OF LEFT ARTIFICIAL HIP JOINT: ICD-10-CM

## 2024-11-21 PROCEDURE — 99024 POSTOP FOLLOW-UP VISIT: CPT

## 2024-11-21 PROCEDURE — 73502 X-RAY EXAM HIP UNI 2-3 VIEWS: CPT | Mod: LT

## 2024-11-21 RX ORDER — ACETAMINOPHEN 500 MG/1
500 TABLET ORAL
Qty: 50 | Refills: 0 | Status: ACTIVE | COMMUNITY
Start: 2024-11-21

## 2024-11-21 RX ORDER — APIXABAN 2.5 MG/1
2.5 TABLET, FILM COATED ORAL
Qty: 54 | Refills: 0 | Status: ACTIVE | COMMUNITY
Start: 2024-11-21

## 2024-11-21 RX ORDER — AMOXICILLIN 500 MG/1
500 CAPSULE ORAL
Qty: 12 | Refills: 2 | Status: ACTIVE | COMMUNITY
Start: 2024-11-21

## 2024-11-26 PROCEDURE — C1713: CPT

## 2024-11-26 PROCEDURE — 97530 THERAPEUTIC ACTIVITIES: CPT

## 2024-11-26 PROCEDURE — 97165 OT EVAL LOW COMPLEX 30 MIN: CPT

## 2024-11-26 PROCEDURE — 97116 GAIT TRAINING THERAPY: CPT

## 2024-11-26 PROCEDURE — 97110 THERAPEUTIC EXERCISES: CPT

## 2024-11-26 PROCEDURE — C1889: CPT

## 2024-11-26 PROCEDURE — 27130 TOTAL HIP ARTHROPLASTY: CPT

## 2024-11-26 PROCEDURE — 85027 COMPLETE CBC AUTOMATED: CPT

## 2024-11-26 PROCEDURE — C1776: CPT

## 2024-11-26 PROCEDURE — 36415 COLL VENOUS BLD VENIPUNCTURE: CPT

## 2024-11-26 PROCEDURE — 97535 SELF CARE MNGMENT TRAINING: CPT

## 2024-11-26 PROCEDURE — 80048 BASIC METABOLIC PNL TOTAL CA: CPT

## 2024-11-26 PROCEDURE — 97161 PT EVAL LOW COMPLEX 20 MIN: CPT

## 2024-11-26 PROCEDURE — 76000 FLUOROSCOPY <1 HR PHYS/QHP: CPT

## 2024-12-03 ENCOUNTER — LABORATORY RESULT (OUTPATIENT)
Age: 84
End: 2024-12-03

## 2024-12-03 ENCOUNTER — APPOINTMENT (OUTPATIENT)
Dept: INTERNAL MEDICINE | Facility: CLINIC | Age: 84
End: 2024-12-03
Payer: MEDICARE

## 2024-12-03 VITALS
HEART RATE: 66 BPM | RESPIRATION RATE: 14 BRPM | BODY MASS INDEX: 19.81 KG/M2 | DIASTOLIC BLOOD PRESSURE: 65 MMHG | HEIGHT: 64 IN | WEIGHT: 116 LBS | SYSTOLIC BLOOD PRESSURE: 130 MMHG

## 2024-12-03 DIAGNOSIS — E78.5 HYPERLIPIDEMIA, UNSPECIFIED: ICD-10-CM

## 2024-12-03 DIAGNOSIS — C64.2 MALIGNANT NEOPLASM OF LEFT KIDNEY, EXCEPT RENAL PELVIS: ICD-10-CM

## 2024-12-03 DIAGNOSIS — C50.912 MALIGNANT NEOPLASM OF UNSPECIFIED SITE OF LEFT FEMALE BREAST: ICD-10-CM

## 2024-12-03 DIAGNOSIS — I10 ESSENTIAL (PRIMARY) HYPERTENSION: ICD-10-CM

## 2024-12-03 DIAGNOSIS — Z00.00 ENCOUNTER FOR GENERAL ADULT MEDICAL EXAMINATION W/OUT ABNORMAL FINDINGS: ICD-10-CM

## 2024-12-03 PROCEDURE — 36415 COLL VENOUS BLD VENIPUNCTURE: CPT

## 2024-12-03 PROCEDURE — G0439: CPT

## 2024-12-03 PROCEDURE — 99497 ADVNCD CARE PLAN 30 MIN: CPT

## 2024-12-04 ENCOUNTER — NON-APPOINTMENT (OUTPATIENT)
Age: 84
End: 2024-12-04

## 2024-12-04 LAB
ALBUMIN SERPL ELPH-MCNC: 3.8 G/DL
ALP BLD-CCNC: 170 U/L
ALT SERPL-CCNC: 27 U/L
ANION GAP SERPL CALC-SCNC: 17 MMOL/L
AST SERPL-CCNC: 20 U/L
BASOPHILS # BLD AUTO: 0.03 K/UL
BASOPHILS NFR BLD AUTO: 0.3 %
BILIRUB SERPL-MCNC: 0.3 MG/DL
BUN SERPL-MCNC: 20 MG/DL
CALCIUM SERPL-MCNC: 9.1 MG/DL
CHLORIDE SERPL-SCNC: 105 MMOL/L
CHOLEST SERPL-MCNC: 143 MG/DL
CO2 SERPL-SCNC: 20 MMOL/L
CREAT SERPL-MCNC: 1.01 MG/DL
EGFR: 55 ML/MIN/1.73M2
EOSINOPHIL # BLD AUTO: 0.17 K/UL
EOSINOPHIL NFR BLD AUTO: 1.7 %
ESTIMATED AVERAGE GLUCOSE: 105 MG/DL
GLUCOSE SERPL-MCNC: 107 MG/DL
HBA1C MFR BLD HPLC: 5.3 %
HCT VFR BLD CALC: 38.6 %
HDLC SERPL-MCNC: 45 MG/DL
HGB BLD-MCNC: 11.8 G/DL
IMM GRANULOCYTES NFR BLD AUTO: 0.4 %
LDLC SERPL CALC-MCNC: 76 MG/DL
LYMPHOCYTES # BLD AUTO: 1.82 K/UL
LYMPHOCYTES NFR BLD AUTO: 17.9 %
MAN DIFF?: NORMAL
MCHC RBC-ENTMCNC: 28.6 PG
MCHC RBC-ENTMCNC: 30.6 G/DL
MCV RBC AUTO: 93.5 FL
MONOCYTES # BLD AUTO: 0.97 K/UL
MONOCYTES NFR BLD AUTO: 9.6 %
NEUTROPHILS # BLD AUTO: 7.12 K/UL
NEUTROPHILS NFR BLD AUTO: 70.1 %
NONHDLC SERPL-MCNC: 98 MG/DL
PLATELET # BLD AUTO: 330 K/UL
POTASSIUM SERPL-SCNC: 3.9 MMOL/L
PROT SERPL-MCNC: 7 G/DL
RBC # BLD: 4.13 M/UL
RBC # FLD: 12.9 %
SODIUM SERPL-SCNC: 141 MMOL/L
TRIGL SERPL-MCNC: 121 MG/DL
TSH SERPL-ACNC: 0.06 UIU/ML
WBC # FLD AUTO: 10.15 K/UL

## 2024-12-10 ENCOUNTER — NON-APPOINTMENT (OUTPATIENT)
Age: 84
End: 2024-12-10

## 2024-12-31 ENCOUNTER — APPOINTMENT (OUTPATIENT)
Dept: ORTHOPEDIC SURGERY | Facility: CLINIC | Age: 84
End: 2024-12-31
Payer: MEDICARE

## 2024-12-31 DIAGNOSIS — Z96.642 PRESENCE OF LEFT ARTIFICIAL HIP JOINT: ICD-10-CM

## 2024-12-31 PROCEDURE — 73502 X-RAY EXAM HIP UNI 2-3 VIEWS: CPT | Mod: LT

## 2024-12-31 PROCEDURE — 99024 POSTOP FOLLOW-UP VISIT: CPT

## 2024-12-31 RX ORDER — AMOXICILLIN 500 MG/1
500 TABLET, FILM COATED ORAL
Qty: 24 | Refills: 1 | Status: ACTIVE | COMMUNITY
Start: 2024-12-31 | End: 1900-01-01

## 2025-04-11 ENCOUNTER — APPOINTMENT (OUTPATIENT)
Dept: ENDOCRINOLOGY | Facility: CLINIC | Age: 85
End: 2025-04-11

## 2025-04-18 ENCOUNTER — RX RENEWAL (OUTPATIENT)
Age: 85
End: 2025-04-18

## 2025-04-23 ENCOUNTER — RX RENEWAL (OUTPATIENT)
Age: 85
End: 2025-04-23

## 2025-05-01 ENCOUNTER — NON-APPOINTMENT (OUTPATIENT)
Age: 85
End: 2025-05-01

## 2025-06-30 NOTE — DISCHARGE NOTE NURSING/CASE MANAGEMENT/SOCIAL WORK - NSDCPNPNATDISSUGG_GEN_ALL_CORE
Sandi Langston Patient Age: 50 year old  MESSAGE:   Patient would like to transfer care to Dr. Justyna Barton.  Patient has IBS and hemorrhoids.  Patient is also due for her 5 year routine colonoscopy.  Please advise.       WEIGHT AND HEIGHT:   Wt Readings from Last 1 Encounters:   01/19/23 98.4 kg (217 lb)     Ht Readings from Last 1 Encounters:   01/19/23 5' 3\" (1.6 m)     BMI Readings from Last 1 Encounters:   01/19/23 38.44 kg/m²       ALLERGIES:  Carisoprodol, Diazepam, Hydrocodone, Iodinated diagnostic agents, Iodine   (environmental or med), Nortriptyline, Pamelor, Venlafaxine, Valsartan, Butalbital-apap-caffeine, Gnp iodides decolorized, Levothyroxine, Sudafed, and Hydrocodone-acetaminophen  Current Outpatient Medications   Medication Sig Dispense Refill    lidocaine (XYLOCAINE) 2 % jelly Apply QID PRN pain to hemorrhoids. 30 mL 0    dicyclomine (BENTYL) 20 MG tablet Take 1 tablet by mouth 2 times daily as needed (abdominal cramping). 30 tablet 5    diphenhydrAMINE (Benadryl Allergy) 25 MG capsule TAKE 2 CAPSULES 1 HOUR BEFORE EXAM 2 capsule 0    predniSONE (DELTASONE) 50 MG tablet TAKE ONE TAB 13 HOURS BEFORE EXAM, ONE TAB 7 HOURS BEFORE EXAM, AND ONE TAB 1 HOUR BEFORE EXAM 3 tablet 0    montelukast (SINGULAIR) 10 MG tablet Take 10 mg by mouth daily.      Multiple Vitamin (Multi-Vitamin) tablet       norethindrone (MICRONOR) 0.35 MG tablet Take 1 tablet by mouth daily.      pantoprazole (PROTONIX) 40 MG tablet Take 40 mg by mouth.      propranolol (INDERAL) 10 MG tablet 30 mg daily.      gabapentin (NEURONTIN) 300 MG capsule Take 300 mg by mouth daily.      levothyroxine 50 MCG tablet Take 50 mcg by mouth daily.      sertraline (ZOLOFT) 25 MG tablet Take 25 mg by mouth 2 times daily.      tiZANidine (ZANAFLEX) 4 MG tablet 0.5-1 tablets as needed.      clonazePAM (KlonoPIN) 0.5 MG tablet Take 0.5 mg by mouth daily as needed.      mometasone-formoterol (Dulera) 100-5 MCG/ACT inhaler Inhale 1 puff into the  lungs 2 times daily. Every other day      traMADol (ULTRAM) 50 MG tablet Take 100 mg by mouth 2 times daily as needed.      calcium (OYST-MINOR) 500 MG tablet Take 1,000 mg by mouth.      Cetirizine HCl 10 MG Cap       Cyanocobalamin 5000 MCG SL Tab       Magnesium Chloride 64 MG Tab       ondansetron (ZOFRAN) 8 MG tablet       zolmitriptan (ZOMIG) 5 MG tablet       hydroxychloroquine (PLAQUENIL) 200 MG tablet Take 400 tablets by mouth daily.       No current facility-administered medications for this visit.     PHARMACY to use:           Pharmacy preference(s) on file:   [id number] - [place], [place] - [address]  [address] [address]  Phone: [phone] Fax: [phone]      CALL BACK INFO: Ok to leave response (including medical information) with family member or on answering machine  ROUTING: Patient's physician/staff        PCP: Pcp, Verify         INS: Payor: MEDICARE / Plan: PARTA AND B / Product Type: MEDICARE   PATIENT ADDRESS:  [address] [place] [id number]   Yes

## 2025-07-16 ENCOUNTER — NON-APPOINTMENT (OUTPATIENT)
Age: 85
End: 2025-07-16

## 2025-07-25 ENCOUNTER — APPOINTMENT (OUTPATIENT)
Dept: INTERNAL MEDICINE | Facility: CLINIC | Age: 85
End: 2025-07-25
Payer: MEDICARE

## 2025-07-25 ENCOUNTER — NON-APPOINTMENT (OUTPATIENT)
Age: 85
End: 2025-07-25

## 2025-07-25 VITALS
SYSTOLIC BLOOD PRESSURE: 112 MMHG | RESPIRATION RATE: 14 BRPM | WEIGHT: 108 LBS | HEART RATE: 70 BPM | BODY MASS INDEX: 18.9 KG/M2 | HEIGHT: 63.19 IN | DIASTOLIC BLOOD PRESSURE: 60 MMHG

## 2025-07-25 DIAGNOSIS — E78.5 HYPERLIPIDEMIA, UNSPECIFIED: ICD-10-CM

## 2025-07-25 DIAGNOSIS — E05.90 THYROTOXICOSIS, UNSPECIFIED W/OUT THYROTOXIC CRISIS OR STORM: ICD-10-CM

## 2025-07-25 DIAGNOSIS — Z01.818 ENCOUNTER FOR OTHER PREPROCEDURAL EXAMINATION: ICD-10-CM

## 2025-07-25 DIAGNOSIS — H25.89 OTHER AGE-RELATED CATARACT: ICD-10-CM

## 2025-07-25 PROCEDURE — 93000 ELECTROCARDIOGRAM COMPLETE: CPT

## 2025-07-25 PROCEDURE — 99214 OFFICE O/P EST MOD 30 MIN: CPT

## 2025-07-25 PROCEDURE — G2211 COMPLEX E/M VISIT ADD ON: CPT

## 2025-07-25 RX ORDER — BIFIDOBACTERIUM LONGUM 10MM CELL
10 CAPSULE ORAL
Refills: 0 | Status: ACTIVE | COMMUNITY
Start: 2025-07-25

## 2025-07-25 RX ORDER — METHIMAZOLE 5 MG/1
5 TABLET ORAL
Refills: 0 | Status: ACTIVE | COMMUNITY
Start: 2025-07-25

## 2025-07-29 NOTE — PRE-OP CHECKLIST - NS ASU TO WHOM HOLDING TO OR
Outpatient Care Management   - Patient Assessment    Patient: Janak Booker  MRN:  [id number]  Date of Service:  7/29/2025  Completed by:  Bridgett Borjas LCSW  Referral Date: 07/03/2025    Reason for Visit   Patient presents with    Social Work Assessment    Goals Complete       Brief Summary:  received a referral from hospital provider for the following Low/Mod SW psychosocial needs due to chronic medical condition. OPCM SW educated pt re: referral and role of OPCM SW.  Pt denied any SW needs. Case closed on this date.       Dalia OKEEFE RN

## 2025-08-08 ENCOUNTER — APPOINTMENT (OUTPATIENT)
Dept: INTERNAL MEDICINE | Facility: CLINIC | Age: 85
End: 2025-08-08
Payer: MEDICARE

## 2025-08-08 VITALS — HEART RATE: 72 BPM | SYSTOLIC BLOOD PRESSURE: 130 MMHG | DIASTOLIC BLOOD PRESSURE: 70 MMHG | RESPIRATION RATE: 14 BRPM

## 2025-08-08 DIAGNOSIS — R74.8 ABNORMAL LEVELS OF OTHER SERUM ENZYMES: ICD-10-CM

## 2025-08-08 DIAGNOSIS — I10 ESSENTIAL (PRIMARY) HYPERTENSION: ICD-10-CM

## 2025-08-08 PROCEDURE — 99214 OFFICE O/P EST MOD 30 MIN: CPT

## 2025-08-08 PROCEDURE — 36415 COLL VENOUS BLD VENIPUNCTURE: CPT

## 2025-08-08 PROCEDURE — G2211 COMPLEX E/M VISIT ADD ON: CPT

## 2025-08-09 ENCOUNTER — NON-APPOINTMENT (OUTPATIENT)
Age: 85
End: 2025-08-09

## 2025-08-09 LAB
ALBUMIN SERPL ELPH-MCNC: 3.9 G/DL
ALP BLD-CCNC: 178 U/L
ALT SERPL-CCNC: 16 U/L
AST SERPL-CCNC: 13 U/L
BILIRUB DIRECT SERPL-MCNC: 0.22 MG/DL
BILIRUB INDIRECT SERPL-MCNC: 0.3 MG/DL
BILIRUB SERPL-MCNC: 0.5 MG/DL
GGT SERPL-CCNC: 75 U/L
PROT SERPL-MCNC: 7 G/DL

## 2025-08-25 ENCOUNTER — APPOINTMENT (OUTPATIENT)
Dept: INTERNAL MEDICINE | Facility: CLINIC | Age: 85
End: 2025-08-25
Payer: MEDICARE

## 2025-08-25 VITALS — DIASTOLIC BLOOD PRESSURE: 70 MMHG | SYSTOLIC BLOOD PRESSURE: 130 MMHG | RESPIRATION RATE: 14 BRPM | HEART RATE: 70 BPM

## 2025-08-25 DIAGNOSIS — N39.0 URINARY TRACT INFECTION, SITE NOT SPECIFIED: ICD-10-CM

## 2025-08-25 LAB
BILIRUB UR QL STRIP: NEGATIVE
CLARITY UR: ABNORMAL
COLLECTION METHOD: NORMAL
GLUCOSE UR-MCNC: NEGATIVE
HCG UR QL: 0.2 EU/DL
HGB UR QL STRIP.AUTO: ABNORMAL
KETONES UR-MCNC: NEGATIVE
LEUKOCYTE ESTERASE UR QL STRIP: ABNORMAL
NITRITE UR QL STRIP: NEGATIVE
PH UR STRIP: 5.5
PROT UR STRIP-MCNC: ABNORMAL
SP GR UR STRIP: 1.01

## 2025-08-25 PROCEDURE — 99214 OFFICE O/P EST MOD 30 MIN: CPT

## 2025-08-25 PROCEDURE — 81003 URINALYSIS AUTO W/O SCOPE: CPT | Mod: QW

## 2025-08-25 PROCEDURE — G2211 COMPLEX E/M VISIT ADD ON: CPT

## 2025-08-28 LAB — BACTERIA UR CULT: ABNORMAL

## 2025-09-02 ENCOUNTER — APPOINTMENT (OUTPATIENT)
Dept: INTERNAL MEDICINE | Facility: CLINIC | Age: 85
End: 2025-09-02
Payer: MEDICARE

## 2025-09-02 ENCOUNTER — NON-APPOINTMENT (OUTPATIENT)
Age: 85
End: 2025-09-02

## 2025-09-02 VITALS
SYSTOLIC BLOOD PRESSURE: 130 MMHG | DIASTOLIC BLOOD PRESSURE: 60 MMHG | HEIGHT: 63 IN | WEIGHT: 107 LBS | BODY MASS INDEX: 18.96 KG/M2 | RESPIRATION RATE: 14 BRPM | HEART RATE: 74 BPM

## 2025-09-02 DIAGNOSIS — I10 ESSENTIAL (PRIMARY) HYPERTENSION: ICD-10-CM

## 2025-09-02 DIAGNOSIS — Z01.818 ENCOUNTER FOR OTHER PREPROCEDURAL EXAMINATION: ICD-10-CM

## 2025-09-02 DIAGNOSIS — E05.90 THYROTOXICOSIS, UNSPECIFIED W/OUT THYROTOXIC CRISIS OR STORM: ICD-10-CM

## 2025-09-02 DIAGNOSIS — H25.89 OTHER AGE-RELATED CATARACT: ICD-10-CM

## 2025-09-02 DIAGNOSIS — E78.5 HYPERLIPIDEMIA, UNSPECIFIED: ICD-10-CM

## 2025-09-02 DIAGNOSIS — K51.80 OTHER ULCERATIVE COLITIS W/OUT COMPLICATIONS: ICD-10-CM

## 2025-09-02 PROCEDURE — 36415 COLL VENOUS BLD VENIPUNCTURE: CPT

## 2025-09-02 PROCEDURE — 93000 ELECTROCARDIOGRAM COMPLETE: CPT

## 2025-09-02 PROCEDURE — 99214 OFFICE O/P EST MOD 30 MIN: CPT

## 2025-09-02 RX ORDER — SULFAMETHOXAZOLE AND TRIMETHOPRIM 800; 160 MG/1; MG/1
800-160 TABLET ORAL TWICE DAILY
Qty: 10 | Refills: 0 | Status: DISCONTINUED | COMMUNITY
Start: 2025-08-25 | End: 2025-09-02

## 2025-09-03 ENCOUNTER — NON-APPOINTMENT (OUTPATIENT)
Age: 85
End: 2025-09-03

## 2025-09-03 LAB
T4 FREE SERPL-MCNC: 1.1 NG/DL
TSH SERPL-ACNC: 1.97 UIU/ML

## 2025-09-09 ENCOUNTER — NON-APPOINTMENT (OUTPATIENT)
Age: 85
End: 2025-09-09

## (undated) DEVICE — DRSG TEGADERM 6"X8"

## (undated) DEVICE — GLV 7 PROTEXIS (WHITE)

## (undated) DEVICE — SUT VICRYL PLUS 1 27" CP UNDYED

## (undated) DEVICE — VALVE YELLOW PORT SEAL PLUS 5MM

## (undated) DEVICE — HANDSET ARGON

## (undated) DEVICE — ACMI SELF-SEALING SEAL UP TO 7FR

## (undated) DEVICE — PREP CHLORAPREP HI-LITE ORANGE 26ML

## (undated) DEVICE — DRSG OPSITE 13.75 X 4"

## (undated) DEVICE — TROCAR COVIDIEN VERSASTEP PLUS 15MM STANDARD

## (undated) DEVICE — SCOPE WARMER SEAL DISP

## (undated) DEVICE — WARMING BLANKET UPPER ADULT

## (undated) DEVICE — SUT MAXON 1 96" T-60

## (undated) DEVICE — HANDPIECE INTERPULSE W MULTI TIP

## (undated) DEVICE — DRAPE SUPINE ESYSUIT

## (undated) DEVICE — SOL IRR BAG NS 0.9% 3000ML

## (undated) DEVICE — SOL IRR POUR NS 0.9% 500ML

## (undated) DEVICE — LAP PAD 4 X 18"

## (undated) DEVICE — POSITIONER POSITIONING ROLL  5X17"

## (undated) DEVICE — SUT POLYSORB 2-0 30" GS-21 UNDYED

## (undated) DEVICE — STAPLER COVIDIEN ENDO GIA STANDARD HANDLE

## (undated) DEVICE — SAW BLADE STRYKER SAGITTAL AGGRESSIVE 25X86.5X1.32MM

## (undated) DEVICE — TROCAR SURGIQUEST AIRSEAL 12MMX100MM

## (undated) DEVICE — SPECIMEN CONTAINER 100ML

## (undated) DEVICE — Device

## (undated) DEVICE — DRAPE LIGHT HANDLE COVER (BLUE)

## (undated) DEVICE — GLV 6.5 PROTEXIS (WHITE)

## (undated) DEVICE — VENODYNE/SCD SLEEVE CALF LARGE

## (undated) DEVICE — FOLEY TRAY 16FR 5CC LTX UMETER CLOSED

## (undated) DEVICE — SHEARS COVIDIEN ENDO SHEAR 5MM X 31CM W UNIPOLAR CAUTERY

## (undated) DEVICE — MARKING PEN W RULER

## (undated) DEVICE — RETRACTOR COVIDIEN ENDOPADDLE 12MM DISP

## (undated) DEVICE — SUT POLYSORB 2-0 30" V-20 UNDYED

## (undated) DEVICE — SUT VICRYL 2-0 27" CT-1 UNDYED

## (undated) DEVICE — PACK TOTAL HIP

## (undated) DEVICE — TUBING AIRSEAL TRI-LUMEN FILTERED

## (undated) DEVICE — ELCTR LAPROSCOPIC FLEXIBLE ARGON COAG ONLY 28CM

## (undated) DEVICE — APPLICATOR FOR ARISTA SHORT 14CM

## (undated) DEVICE — DRSG DERMABOND PRINEO 60CM

## (undated) DEVICE — TAPE SILK 3"

## (undated) DEVICE — VISITEC 4X4

## (undated) DEVICE — DRAPE MAYO STAND 30"

## (undated) DEVICE — VENODYNE/SCD SLEEVE CALF MEDIUM

## (undated) DEVICE — SUT QUILL PDO 0 36CM 36MM

## (undated) DEVICE — MEDICATION LABELS W MARKER

## (undated) DEVICE — LAP PAD 18 X 18"

## (undated) DEVICE — SUT POLYSORB 3-0 27" GS-11 UNDYED

## (undated) DEVICE — INSUFFLATION NDL COVIDIEN STEP 14G FOR STEP/VERSASTEP

## (undated) DEVICE — SUT CLIP LAPRA-TY ABSORBABLE SIZE 0.118 TO 0.12" VIOLET

## (undated) DEVICE — SUT POLYSORB 1 36" GS-21 UNDYED

## (undated) DEVICE — DRAPE TOWEL BLUE 17" X 24"

## (undated) DEVICE — SOL IRR BAG H2O 3000ML

## (undated) DEVICE — SOL IRR POUR H2O 1500ML

## (undated) DEVICE — TROCAR COVIDIEN VERSASTEP PLUS 12MM STANDARD

## (undated) DEVICE — DRSG STOCKINETTE CLOTH 6 X 72"

## (undated) DEVICE — DRSG COBAN 6"

## (undated) DEVICE — PACK ADVANCED LAPAROSCOPIC NS

## (undated) DEVICE — GLV 8 PROTEXIS (WHITE)

## (undated) DEVICE — SUT POLYSORB 0 18" MP UNDYED

## (undated) DEVICE — HOOD FLYTE STRYKER HELMET SHIELD

## (undated) DEVICE — SUT CAPROSYN 4-0 P-12 UNDYED

## (undated) DEVICE — SUT STRATAFIX SPIRAL MONOCRYL PLUS 4-0 14CM PS-2 UNDYED

## (undated) DEVICE — NDL SPINAL 18G X 3.5" (PINK)

## (undated) DEVICE — SUT STRATAFIX SPIRAL MONOCRYL PLUS 3-0 30CM PS-2 UNDYED

## (undated) DEVICE — SOL IRR POUR H2O 250ML

## (undated) DEVICE — ELCTR BOVIE PENCIL HANDPIECE

## (undated) DEVICE — DRSG MASTISOL

## (undated) DEVICE — DRAPE INSTRUMENT POUCH 6.75" X 11"

## (undated) DEVICE — POSITIONER FOAM EGG CRATE ULNAR 2PCS (PINK)

## (undated) DEVICE — ELCTR ROCKER SWITCH PENCIL BLUE 10FT

## (undated) DEVICE — DRSG STERISTRIPS 0.25 X 3"

## (undated) DEVICE — DRAPE XL SHEET 77X98"

## (undated) DEVICE — WOUND IRR IRRISEPT W 0.5 CHG

## (undated) DEVICE — STAPLER SKIN VISI-STAT 35 WIDE

## (undated) DEVICE — WARMING BLANKET LOWER ADULT

## (undated) DEVICE — ENDOCATCH II 15MM

## (undated) DEVICE — SYR LUER LOK 50CC

## (undated) DEVICE — LIGASURE ATLAS 10MM 37CM

## (undated) DEVICE — ELCTR AQUAMANTYS BIPOLAR SEALER 6.0

## (undated) DEVICE — GLV 7.5 PROTEXIS (WHITE)

## (undated) DEVICE — DRAPE C ARM 41X140"

## (undated) DEVICE — SUT POLYSORB 1 27" GS-21 UNDYED

## (undated) DEVICE — GLV 8.5 PROTEXIS (WHITE)

## (undated) DEVICE — TUBING STRYKEFLOW II SUCTION / IRRIGATOR